# Patient Record
Sex: FEMALE | Race: BLACK OR AFRICAN AMERICAN | NOT HISPANIC OR LATINO | Employment: PART TIME | URBAN - METROPOLITAN AREA
[De-identification: names, ages, dates, MRNs, and addresses within clinical notes are randomized per-mention and may not be internally consistent; named-entity substitution may affect disease eponyms.]

---

## 2022-02-23 ENCOUNTER — INITIAL PRENATAL (OUTPATIENT)
Dept: OBSTETRICS AND GYNECOLOGY | Facility: CLINIC | Age: 22
End: 2022-02-23

## 2022-02-23 VITALS
BODY MASS INDEX: 23.74 KG/M2 | HEIGHT: 62 IN | DIASTOLIC BLOOD PRESSURE: 70 MMHG | SYSTOLIC BLOOD PRESSURE: 109 MMHG | WEIGHT: 129 LBS

## 2022-02-23 DIAGNOSIS — Z13.71 GENETIC DISEASE CARRIER STATUS TESTING, FEMALE: ICD-10-CM

## 2022-02-23 DIAGNOSIS — Z3A.01 LESS THAN 8 WEEKS GESTATION OF PREGNANCY: ICD-10-CM

## 2022-02-23 DIAGNOSIS — Z78.9 VARICELLA VACCINATION STATUS UNKNOWN: ICD-10-CM

## 2022-02-23 DIAGNOSIS — O09.291 HISTORY OF POSTPARTUM HEMORRHAGE, CURRENTLY PREGNANT IN FIRST TRIMESTER: ICD-10-CM

## 2022-02-23 DIAGNOSIS — Z11.3 SCREEN FOR STD (SEXUALLY TRANSMITTED DISEASE): ICD-10-CM

## 2022-02-23 DIAGNOSIS — Z34.91 UNCERTAIN DATES, ANTEPARTUM, FIRST TRIMESTER: ICD-10-CM

## 2022-02-23 DIAGNOSIS — O21.9 NAUSEA AND VOMITING IN PREGNANCY: ICD-10-CM

## 2022-02-23 DIAGNOSIS — O99.331 MATERNAL TOBACCO USE IN FIRST TRIMESTER: ICD-10-CM

## 2022-02-23 DIAGNOSIS — Z34.81 ENCOUNTER FOR SUPERVISION OF OTHER NORMAL PREGNANCY IN FIRST TRIMESTER: Primary | ICD-10-CM

## 2022-02-23 LAB
B-HCG UR QL: POSITIVE
EXPIRATION DATE: ABNORMAL
GLUCOSE UR STRIP-MCNC: NEGATIVE MG/DL
INTERNAL NEGATIVE CONTROL: POSITIVE
INTERNAL POSITIVE CONTROL: POSITIVE
Lab: ABNORMAL
PROT UR STRIP-MCNC: ABNORMAL MG/DL

## 2022-02-23 PROCEDURE — 81025 URINE PREGNANCY TEST: CPT | Performed by: OBSTETRICS & GYNECOLOGY

## 2022-02-23 PROCEDURE — 99204 OFFICE O/P NEW MOD 45 MIN: CPT | Performed by: OBSTETRICS & GYNECOLOGY

## 2022-02-23 RX ORDER — PNV NO.95/FERROUS FUM/FOLIC AC 28MG-0.8MG
1 TABLET ORAL DAILY
Qty: 90 TABLET | Refills: 3 | Status: SHIPPED | OUTPATIENT
Start: 2022-02-23 | End: 2022-09-19

## 2022-02-23 RX ORDER — PNV NO.95/FERROUS FUM/FOLIC AC 28MG-0.8MG
1 TABLET ORAL DAILY
COMMUNITY
Start: 2022-02-15 | End: 2022-02-23 | Stop reason: SDUPTHER

## 2022-02-23 RX ORDER — PROMETHAZINE HYDROCHLORIDE 25 MG/1
25 TABLET ORAL EVERY 6 HOURS PRN
Qty: 30 TABLET | Refills: 2 | Status: SHIPPED | OUTPATIENT
Start: 2022-02-23 | End: 2022-09-19

## 2022-02-23 NOTE — PROGRESS NOTES
Initial ob visit     CC- Here for care of pregnancy     Sarah Clifford is being seen today for her first obstetrical visit.  She is a 21 y.o.    6w0d gestation.     # 1 - Date: 19, Sex: Male, Weight: 2835 g (6 lb 4 oz), GA: 40w1d, Delivery: Vaginal, Spontaneous, Apgar1: None, Apgar5: None, Living: Living, Birth Comments: None    # 2 - Date: None, Sex: None, Weight: None, GA: None, Delivery: None, Apgar1: None, Apgar5: None, Living: None, Birth Comments: None      Current obstetric complaints : spotting x 1 day on 2022  Duration/severity of complaints:   Initial positive test date : 1.5 weeks ago     Location :  @ PCP's office    Prior obstetric issues, potential pregnancy concerns: none  Family history of genetic issues (includes FOB): none  Prior infections concerning in pregnancy (Rash, fever in last 2 weeks): none  Varicella Hx -negative history  Prior testing for Cystic Fibrosis Carrier or Sickle Cell Trait- unknown status  Prepregnancy BMI - Body mass index is 23.59 kg/m².  Hx of HSV for patient or partner : No     History reviewed. No pertinent past medical history.    History reviewed. No pertinent surgical history.      Current Outpatient Medications:   •  Prenatal Vit-Fe Fumarate-FA (prenatal vitamin 28-0.8) 28-0.8 MG tablet tablet, Take 1 tablet by mouth Daily., Disp: 90 tablet, Rfl: 3  •  promethazine (PHENERGAN) 25 MG tablet, Take 1 tablet by mouth Every 6 (Six) Hours As Needed for Nausea or Vomiting., Disp: 30 tablet, Rfl: 2    No Known Allergies    Social History     Socioeconomic History   • Marital status: Single   Tobacco Use   • Smoking status: Current Every Day Smoker     Types: Cigarettes   • Smokeless tobacco: Never Used   Substance and Sexual Activity   • Alcohol use: Not Currently   • Drug use: Not Currently   • Sexual activity: Yes     Partners: Male       Family History   Problem Relation Age of Onset   • Breast cancer Cousin    • Ovarian cancer Neg Hx    • Uterine cancer  "Neg Hx    • Colon cancer Neg Hx    • Deep vein thrombosis Neg Hx    • Pulmonary embolism Neg Hx        Review of systems     Constitutional : Nausea, fatigue nausea present, fatigue present    : Vaginal bleeding, cramping no bleeding, mild cramping   Breast Tenderness : yes   All other systems reviewed and negative        Objective    /70   Ht 157.5 cm (62\")   Wt 58.5 kg (129 lb)   LMP 01/12/2022 (Approximate)   BMI 23.59 kg/m²       General Appearance:    Alert, cooperative, in no acute distress, habitus normal    Head:    Normocephalic, without obvious abnormality, atraumatic   Eyes:            Lids and lashes normal, conjunctivae and sclerae normal, no   icterus, no pallor, corneas clear   Ears:    Ears appear intact with no abnormalities noted       Neck:   No adenopathy, supple, trachea midline, no thyromegaly   Back:     No kyphosis present, no scoliosis present,                       Lungs:     Clear to auscultation,respirations regular, even and     unlabored    Heart:    Regular rhythm and normal rate, normal S1 and S2, no            murmur, no gallop, no rub, no click   Breast Exam:    No masses, No nipple discharge   Abdomen:     Normal bowel sounds, no masses, no organomegaly, soft        non-tender, non-distended, no guarding, no rebound                 tenderness   Genitalia:    Vulva - BUS-WNL, NEFG    Vagina - No discharge, No bleeding    Cervix - No Lesions, closed     Uterus - Consistent with 7-8 weeks, anteverted     Adnexa - No mass, NT    Pelvimetry - clinically adequate, gynecoid pelvis     Extremities:   Moves all extremities well, no edema, no cyanosis, no              redness   Pulses:   Pulses palpable and equal bilaterally   Skin:   No bleeding, bruising or rash   Lymph nodes:   No palpable adenopathy   Neurologic:   Sensation intact, A&O times 3      Assessment & Plan     Diagnoses and all orders for this visit:    1. Encounter for supervision of other normal pregnancy in " first trimester (Primary)  -     OB Panel With HIV  -     Urine Culture - , Urine, Clean Catch    2. Less than 8 weeks gestation of pregnancy  -     POC Pregnancy, Urine    3. Nausea and vomiting in pregnancy    4. History of postpartum hemorrhage, currently pregnant in first trimester    5. Maternal tobacco use in first trimester    6. Screen for STD (sexually transmitted disease)  -     Chlamydia trachomatis, Neisseria gonorrhoeae, Trichomonas vaginalis, PCR - Swab, Vagina    7. Varicella vaccination status unknown  -     Varicella Zoster Antibody, IgG    8. Genetic disease carrier status testing, female  -     Inheritest Society Guided - Blood,    9. Uncertain dates, antepartum, first trimester  -     HCG, B-subunit, Quantitative  -     US Ob Transvaginal    Other orders  -     promethazine (PHENERGAN) 25 MG tablet; Take 1 tablet by mouth Every 6 (Six) Hours As Needed for Nausea or Vomiting.  Dispense: 30 tablet; Refill: 2  -     Prenatal Vit-Fe Fumarate-FA (prenatal vitamin 28-0.8) 28-0.8 MG tablet tablet; Take 1 tablet by mouth Daily.  Dispense: 90 tablet; Refill: 3        1) Pregnancy at 6w0d  2) nausea in pregnancy   Trial of phenergan   3) Hx of postpartum hemorrhage   Risk factor, notify delivery personal   4) Tobacco use   Working on quitting with nicotrol nasal spray          Activity recommendation : 150 minutes/week of moderate intensity aerobic activity unless we limit for bleeding, hypertension or other pregnancy complication   Patient is on Prenatal vitamins  Problem list reviewed and updated.  Reviewed routine prenatal care with the office to include but not limited to   Zika (travel restrictions/ok to use insect repellant), not to changing cat litter, food restrictions, avoidance of alcohol, tobacco and drugs and saunas/hot tubs.   All questions answered.     Rober Grayson MD   2/23/2022  10:49 EST

## 2022-02-24 PROBLEM — Z28.39 MATERNAL VARICELLA, NON-IMMUNE: Status: ACTIVE | Noted: 2022-02-24

## 2022-02-24 PROBLEM — O09.899 MATERNAL VARICELLA, NON-IMMUNE: Status: ACTIVE | Noted: 2022-02-24

## 2022-02-24 LAB
ABO GROUP BLD: ABNORMAL
BASOPHILS # BLD AUTO: 0 X10E3/UL (ref 0–0.2)
BASOPHILS NFR BLD AUTO: 1 %
BLD GP AB SCN SERPL QL: NEGATIVE
EOSINOPHIL # BLD AUTO: 0.1 X10E3/UL (ref 0–0.4)
EOSINOPHIL NFR BLD AUTO: 2 %
ERYTHROCYTE [DISTWIDTH] IN BLOOD BY AUTOMATED COUNT: 17 % (ref 11.7–15.4)
HBV SURFACE AG SERPL QL IA: NEGATIVE
HCG INTACT+B SERPL-ACNC: 2610 MIU/ML
HCT VFR BLD AUTO: 35.4 % (ref 34–46.6)
HCV AB S/CO SERPL IA: <0.1 S/CO RATIO (ref 0–0.9)
HGB BLD-MCNC: 11.2 G/DL (ref 11.1–15.9)
HIV 1+2 AB+HIV1 P24 AG SERPL QL IA: NON REACTIVE
IMM GRANULOCYTES # BLD AUTO: 0 X10E3/UL (ref 0–0.1)
IMM GRANULOCYTES NFR BLD AUTO: 0 %
LYMPHOCYTES # BLD AUTO: 2 X10E3/UL (ref 0.7–3.1)
LYMPHOCYTES NFR BLD AUTO: 48 %
MCH RBC QN AUTO: 21.9 PG (ref 26.6–33)
MCHC RBC AUTO-ENTMCNC: 31.6 G/DL (ref 31.5–35.7)
MCV RBC AUTO: 69 FL (ref 79–97)
MONOCYTES # BLD AUTO: 0.5 X10E3/UL (ref 0.1–0.9)
MONOCYTES NFR BLD AUTO: 11 %
NEUTROPHILS # BLD AUTO: 1.5 X10E3/UL (ref 1.4–7)
NEUTROPHILS NFR BLD AUTO: 38 %
PLATELET # BLD AUTO: 398 X10E3/UL (ref 150–450)
RBC # BLD AUTO: 5.12 X10E6/UL (ref 3.77–5.28)
RH BLD: POSITIVE
RPR SER QL: NON REACTIVE
RUBV IGG SERPL IA-ACNC: 1.67 INDEX
VZV IGG SER IA-ACNC: <135 INDEX
WBC # BLD AUTO: 4 X10E3/UL (ref 3.4–10.8)

## 2022-02-25 ENCOUNTER — TELEPHONE (OUTPATIENT)
Dept: OBSTETRICS AND GYNECOLOGY | Facility: CLINIC | Age: 22
End: 2022-02-25

## 2022-02-25 LAB
BACTERIA UR CULT: NO GROWTH
BACTERIA UR CULT: NORMAL
C TRACH RRNA SPEC QL NAA+PROBE: NEGATIVE
N GONORRHOEA RRNA SPEC QL NAA+PROBE: NEGATIVE
T VAGINALIS DNA SPEC QL NAA+PROBE: NEGATIVE

## 2022-02-25 NOTE — TELEPHONE ENCOUNTER
----- Message from Estefany Irizarry MA sent at 2/24/2022  1:37 PM EST -----  L/m for pt/araceli  ----- Message -----  From: Rober Grayson MD  Sent: 2/24/2022  11:09 AM EST  To: ANA Mead, her HCG is 2,610 yesterday. This is consistent with 5-6 weeks. So keep with plan to do ultrasound coming up. Please let her know. Thanks, Dr. Grayson

## 2022-03-01 ENCOUNTER — TELEPHONE (OUTPATIENT)
Dept: OBSTETRICS AND GYNECOLOGY | Facility: CLINIC | Age: 22
End: 2022-03-01

## 2022-03-01 NOTE — TELEPHONE ENCOUNTER
She went to [facility] I am not sure how to go about getting a report from them. I tried getting more info from her about what they saw to give her the dates. She says they weren't able to see much but they were trying to get measurements that were not matching up (not sure what they were measuring because she said there wasn't a sac).   I let her know that at 5 weeks typically not much is seen on ultrasound and she has an appointment for ultrasound and to see you on 3/7. She seemed okay with this and will come to her appointment.     Thanks,   Aleja

## 2022-03-01 NOTE — TELEPHONE ENCOUNTER
Aleja,     Yes. We do not typically get a report from those type of places (they are not clinical and do not generate a report). They are typically designed to encourage pregnancy and have them avoid termination by seeing the baby on ultrasound. Of note many of the people doing ultrasounds are volunteers and I doubt they have much in the way of credentials (so it is not a medical ultrasound)     So we will just use our dating. I do not doubt she is earlier than we expect.  But will just review with more information from our office.     Thanks,   Dr. Grayson

## 2022-03-01 NOTE — TELEPHONE ENCOUNTER
Aleja,     We need significantly more information   Where did she go for an ultrasound, what did they see to give her that date?- what date was this she had the ultrasound.   Who told her they saw nothing?    You are correct. In early pregnancy we use the first day of the last normal menstrual period, but then adjust based on ultrasound if things fall out of a specific range depending on how far along she is. But typically at 5 weeks, not much is seen on ultrasound, this is why we traditionally try and delay the ultrasound until 7-8 weeks, when we can get good date of the baby, and see certain things like a heartbeat.  If she has concerns, I would suggest we try and get a copy of the report and that she make an appointment to see us in the office.     Thanks,   Dr. Grayson

## 2022-03-01 NOTE — TELEPHONE ENCOUNTER
Joshua Grayson,   Pt called she went somewhere else and got ultrasound done they measured her at 5 weeks 2 days. Which is different than she was told her. Tried letting her know that date can change with ultrasound. She said the ultrasound tech could not see anything on the ultrasound and pt was concerned about it. Please advise.     Thanks,   Aleja

## 2022-03-07 ENCOUNTER — ROUTINE PRENATAL (OUTPATIENT)
Dept: OBSTETRICS AND GYNECOLOGY | Facility: CLINIC | Age: 22
End: 2022-03-07

## 2022-03-07 VITALS — DIASTOLIC BLOOD PRESSURE: 70 MMHG | BODY MASS INDEX: 23.78 KG/M2 | SYSTOLIC BLOOD PRESSURE: 114 MMHG | WEIGHT: 130 LBS

## 2022-03-07 DIAGNOSIS — O03.9 SPONTANEOUS MISCARRIAGE: Primary | ICD-10-CM

## 2022-03-07 LAB
GLUCOSE UR STRIP-MCNC: NEGATIVE MG/DL
PROT UR STRIP-MCNC: ABNORMAL MG/DL

## 2022-03-07 PROCEDURE — 99213 OFFICE O/P EST LOW 20 MIN: CPT | Performed by: OBSTETRICS & GYNECOLOGY

## 2022-03-07 RX ORDER — CEPHALEXIN 500 MG/1
CAPSULE ORAL
COMMUNITY
Start: 2022-03-05 | End: 2022-09-19

## 2022-03-07 NOTE — PROGRESS NOTES
OB follow up     Sarah Clifford is a 21 y.o.  7w5d being seen today for her obstetrical visit.  Patient reports no complaints. Fetal movement: absent.. US c/w MAB.     Started bleeding on Saturday   [facility] for evaluation   HCG, quant - 6,736   Ultrasound on Saturday with 1.1 cm sac, consistent with early GS (no yolk sac or fetal pole seen)   Getting heavier with active clots.   Hg on Saturday was 12 grams         Review of Systems  Cramping/contractions : present  Vaginal bleeding: present   Fetal movement none     /70   Wt 59 kg (130 lb)   LMP 2022 (Approximate)   BMI 23.78 kg/m²     Speculum/Cervix Minimal blood in vault, POC at cervix grasped and removed.    Uterine Size: 7-8 cm       Assessment    Diagnoses and all orders for this visit:    1. Spontaneous miscarriage (Primary)  -     Reference Histopathology  -     HCG, B-subunit, Quantitative; Future        1) Spontaneous miscarriage  Rh+  Appears close to complete  Ultrasound with 1.6 cm lining today, but no evidence of POC or GS   Etiology, treatment options and follow up reviewed.   Going to watch bleeding pattern   Check path of POC at cervix  Do HCG, quant in 7-10 days   Call if bleeding heavy, persistent to consider misoprostol or D&C  Discussed trying again vs contraception   Considering options   Can let us know down the road once time to process.       Plan    Expectations and warning signs reviewed  Next steps discussed in detail     Rober Grayson MD   3/7/2022  16:03 EST

## 2022-03-09 LAB
DX ICD CODE: NORMAL
DX ICD CODE: NORMAL
PATH REPORT.FINAL DX SPEC: NORMAL
PATH REPORT.GROSS SPEC: NORMAL
PATH REPORT.SITE OF ORIGIN SPEC: NORMAL
PATHOLOGIST NAME: NORMAL
PAYMENT PROCEDURE: NORMAL

## 2022-03-10 NOTE — PROGRESS NOTES
Estefany, path confirms miscarriage - can you see how her bleeding is progressing and let her know. Thanks, Dr. Grayson

## 2022-03-11 ENCOUNTER — TELEPHONE (OUTPATIENT)
Dept: OBSTETRICS AND GYNECOLOGY | Facility: CLINIC | Age: 22
End: 2022-03-11

## 2022-03-11 RX ORDER — MISOPROSTOL 200 UG/1
400 TABLET ORAL EVERY 4 HOURS
Qty: 4 TABLET | Refills: 0 | Status: SHIPPED | OUTPATIENT
Start: 2022-03-11 | End: 2022-03-12

## 2022-03-11 NOTE — TELEPHONE ENCOUNTER
Poke to pt, sounds like she has passed the pregnancy. Bleeding is now spotting like the end of her period. She would like to monitor the bleeding over the weekend, have her blood drawn and follow from there.    Estefany

## 2022-03-11 NOTE — TELEPHONE ENCOUNTER
----- Message from Estefany Irizarry MA sent at 3/10/2022  2:20 PM EST -----  L/m for pt/araceli  ----- Message -----  From: Rober Grayson MD  Sent: 3/10/2022  12:33 PM EST  To: ANA Mead, path confirms miscarriage - can you see how her bleeding is progressing and let her know. Thanks, Dr. Grayson

## 2022-03-11 NOTE — TELEPHONE ENCOUNTER
Estefany,     I think that she likely did pass it all. She should not be spotting, just more like the end of her cycle. If she wants to do the medication to ensure all is expelled. It was sent to pharmacy. Should take motrin or aleve before taking this medication.     Thanks,   Dr. Grayson

## 2022-03-11 NOTE — TELEPHONE ENCOUNTER
Pt returning your call. She said she thought maybe she knew what it was about and it was concerning her miscarriage. She said she believed she has passed it all after she had big clots come out. She said that she was told her blood should fade into a pink and it did for a little while but now when she wipes, there is still bright red blood coming out. She said that her and Dr. Grayson discussed medication if this continued. Please advise.    Thanks,  Narciso

## 2022-03-11 NOTE — TELEPHONE ENCOUNTER
----- Message from Estefany Irizarry MA sent at 3/11/2022  9:40 AM EST -----  Dr. Grayson-    Pt's response...      Pt returning your call. She said she thought maybe she knew what it was about and it was concerning her miscarriage. She said she believed she has passed it all after she had big clots come out. She said that she was told her blood should fade into a pink and it did for a little while but now when she wipes, there is still bright red blood coming out. She said that her and Dr. Grayson discussed medication if this continued. Please advise.    Thanks,  Estefany  ----- Message -----  From: Estefany Irizarry MA  Sent: 3/10/2022   2:22 PM EST  To: Estefany Irizarry MA    L/m for pt/araceli  ----- Message -----  From: Rober Grayson MD  Sent: 3/10/2022  12:33 PM EST  To: ANA Mead path confirms miscarriage - can you see how her bleeding is progressing and let her know. Thanks, Dr. Grayson

## 2022-03-16 ENCOUNTER — TELEPHONE (OUTPATIENT)
Dept: OBSTETRICS AND GYNECOLOGY | Facility: CLINIC | Age: 22
End: 2022-03-16

## 2022-03-16 LAB
CLINICAL INFO: ABNORMAL
ETHNIC BACKGROUND STATED: ABNORMAL
GENE DIS ANL CARRIER INTERP-IMP: ABNORMAL
GENE MUT TESTED BLD/T: ABNORMAL
GENERAL COMMENTS:: ABNORMAL
LAB DIRECTOR NAME PROVIDER: ABNORMAL
LABORATORY COMMENT REPORT: ABNORMAL
Lab: ABNORMAL
REASON FOR REFERRAL (NARRATIVE): ABNORMAL
REF LAB TEST METHOD: ABNORMAL
SPECIMEN SOURCE: ABNORMAL

## 2022-03-16 NOTE — TELEPHONE ENCOUNTER
----- Message from Rober Grayson MD sent at 3/16/2022 12:48 PM EDT -----  Estefany, we discussed this. She is aware she has sickle cell trait (her father has sickle cell disease- so she knows how it works)> Miscarried this pregnancy, we can test FOB in future pregnancies. I did encourage her to do final HCG, quant - her bleeding has stopped. Thanks, Dr. Grayson

## 2022-09-19 ENCOUNTER — INITIAL PRENATAL (OUTPATIENT)
Dept: OBSTETRICS AND GYNECOLOGY | Facility: CLINIC | Age: 22
End: 2022-09-19

## 2022-09-19 VITALS — DIASTOLIC BLOOD PRESSURE: 61 MMHG | WEIGHT: 123 LBS | BODY MASS INDEX: 22.5 KG/M2 | SYSTOLIC BLOOD PRESSURE: 92 MMHG

## 2022-09-19 DIAGNOSIS — O21.9 NAUSEA AND VOMITING IN PREGNANCY: ICD-10-CM

## 2022-09-19 DIAGNOSIS — O09.899 MATERNAL VARICELLA, NON-IMMUNE: ICD-10-CM

## 2022-09-19 DIAGNOSIS — O09.291 HISTORY OF POSTPARTUM HEMORRHAGE, CURRENTLY PREGNANT IN FIRST TRIMESTER: ICD-10-CM

## 2022-09-19 DIAGNOSIS — D57.3 SICKLE CELL TRAIT IN MOTHER AFFECTING PREGNANCY: ICD-10-CM

## 2022-09-19 DIAGNOSIS — Z34.81 ENCOUNTER FOR SUPERVISION OF OTHER NORMAL PREGNANCY IN FIRST TRIMESTER: Primary | ICD-10-CM

## 2022-09-19 DIAGNOSIS — O99.019 SICKLE CELL TRAIT IN MOTHER AFFECTING PREGNANCY: ICD-10-CM

## 2022-09-19 DIAGNOSIS — O09.291 PRIOR MISCARRIAGE WITH PREGNANCY IN FIRST TRIMESTER, ANTEPARTUM: ICD-10-CM

## 2022-09-19 DIAGNOSIS — Z11.3 SCREEN FOR STD (SEXUALLY TRANSMITTED DISEASE): ICD-10-CM

## 2022-09-19 DIAGNOSIS — Z28.39 MATERNAL VARICELLA, NON-IMMUNE: ICD-10-CM

## 2022-09-19 LAB
B-HCG UR QL: POSITIVE
EXPIRATION DATE: ABNORMAL
GLUCOSE UR STRIP-MCNC: NEGATIVE MG/DL
INTERNAL NEGATIVE CONTROL: NEGATIVE
INTERNAL POSITIVE CONTROL: POSITIVE
Lab: ABNORMAL
PROT UR STRIP-MCNC: ABNORMAL MG/DL

## 2022-09-19 PROCEDURE — 81025 URINE PREGNANCY TEST: CPT | Performed by: OBSTETRICS & GYNECOLOGY

## 2022-09-19 PROCEDURE — 99214 OFFICE O/P EST MOD 30 MIN: CPT | Performed by: OBSTETRICS & GYNECOLOGY

## 2022-09-19 RX ORDER — SWAB
1 SWAB, NON-MEDICATED MISCELLANEOUS DAILY
Qty: 90 EACH | Refills: 3 | Status: SHIPPED | OUTPATIENT
Start: 2022-09-19

## 2022-09-19 RX ORDER — PROMETHAZINE HYDROCHLORIDE 25 MG/1
25 TABLET ORAL EVERY 6 HOURS PRN
Qty: 30 TABLET | Refills: 2 | Status: SHIPPED | OUTPATIENT
Start: 2022-09-19 | End: 2023-03-20

## 2022-09-19 RX ORDER — MEGESTROL ACETATE 40 MG
1 TABLET ORAL DAILY
COMMUNITY
Start: 2022-09-14 | End: 2023-03-20

## 2022-09-19 NOTE — PROGRESS NOTES
Initial ob visit     CC- Here for care of pregnancy     Sarah Clifford is being seen today for her first obstetrical visit.  She is a 21 y.o.    5w4d gestation.     # 1 - Date: 19, Sex: Male, Weight: 2835 g (6 lb 4 oz), GA: 40w1d, Delivery: Vaginal, Spontaneous, Apgar1: None, Apgar5: None, Living: Living, Birth Comments: None    # 2 - Date: 24, Sex: None, Weight: None, GA: 7w0d, Delivery: Spontaneous , Apgar1: None, Apgar5: None, Living: None, Birth Comments: None    # 3 - Date: None, Sex: None, Weight: None, GA: None, Delivery: None, Apgar1: None, Apgar5: None, Living: None, Birth Comments: None      Current obstetric complaints : fatigue   Duration/severity of complaints:   Initial positive test date : 2022     Location : @ home    Recent early miscarriage.     Prior obstetric issues, potential pregnancy concerns: none  Family history of genetic issues (includes FOB): none  Prior infections concerning in pregnancy (Rash, fever in last 2 weeks): none  Varicella Hx  Negative status  Prior testing for Cystic Fibrosis Carrier or Sickle Cell Trait- unknown status  Prepregnancy BMI - Body mass index is 22.5 kg/m².  Hx of HSV for patient or partner : No    History reviewed. No pertinent past medical history.    History reviewed. No pertinent surgical history.      Current Outpatient Medications:   •  Pediatric Multivitamins-Iron (Cerovite Jr) 18 MG chewable tablet chewable tablet, Chew 1 tablet Daily. Chew and swallow., Disp: , Rfl:   •  Prenatal 28-0.8 MG tablet, Take 1 tablet by mouth Daily. Please use formulary or generic, with DHA ideal, Disp: 90 each, Rfl: 3  •  promethazine (PHENERGAN) 25 MG tablet, Take 1 tablet by mouth Every 6 (Six) Hours As Needed for Nausea or Vomiting., Disp: 30 tablet, Rfl: 2    No Known Allergies    Social History     Socioeconomic History   • Marital status: Single   Tobacco Use   • Smoking status: Current Every Day Smoker     Types: Cigarettes   •  Smokeless tobacco: Never Used   Substance and Sexual Activity   • Alcohol use: Not Currently   • Drug use: Not Currently   • Sexual activity: Yes     Partners: Male       Family History   Problem Relation Age of Onset   • Breast cancer Cousin    • Ovarian cancer Neg Hx    • Uterine cancer Neg Hx    • Colon cancer Neg Hx    • Deep vein thrombosis Neg Hx    • Pulmonary embolism Neg Hx        Review of systems     Constitutional : Nausea, fatigue nausea present, fatigue present    : Vaginal bleeding, cramping no bleeding, mild cramping   Breast Tenderness : yes  All other systems reviewed and negative        Objective    BP 92/61   Wt 55.8 kg (123 lb)   LMP 08/11/2022 (Approximate)   Breastfeeding Unknown   BMI 22.50 kg/m²       General Appearance:    Alert, cooperative, in no acute distress, habitus normal.    Head:    Normocephalic, without obvious abnormality, atraumatic   Eyes:            Lids and lashes normal, conjunctivae and sclerae normal, no   icterus, no pallor, corneas clear   Ears:    Ears appear intact with no abnormalities noted       Neck:   No adenopathy, supple, trachea midline, no thyromegaly   Back:     No kyphosis present, no scoliosis present,                       Lungs:     Clear to auscultation,respirations regular, even and     unlabored    Heart:    Regular rhythm and normal rate, normal S1 and S2, no            murmur, no gallop, no rub, no click   Breast Exam:    No masses, No nipple discharge   Abdomen:     Normal bowel sounds, no masses, no organomegaly, soft        non-tender, non-distended, no guarding, no rebound                 tenderness   Genitalia:    Vulva - BUS-WNL, NEFG    Vagina - No discharge, No bleeding    Cervix - No Lesions, closed     Uterus - Consistent with 6-7 weeks, anteverted     Adnexa - No mass, NT    Pelvimetry - clinically adequate, gynecoid pelvis     Extremities:   Moves all extremities well, no edema, no cyanosis, no              redness   Pulses:    Pulses palpable and equal bilaterally   Skin:   No bleeding, bruising or rash   Lymph nodes:   No palpable adenopathy   Neurologic:   Sensation intact, A&O times 3        Brief Urine Lab Results  (Last result in the past 365 days)      Color   Clarity   Blood   Leuk Est   Nitrite   Protein   CREAT   Urine HCG        09/19/22 1319               Positive              Assessment & Plan     Diagnoses and all orders for this visit:    1. Encounter for supervision of other normal pregnancy in first trimester (Primary)  -     POC Pregnancy, Urine  -     OB Panel With HIV  -     Urine Culture - , Urine, Clean Catch    2. Prior miscarriage with pregnancy in first trimester, antepartum  -     US Ob Transvaginal; Future  -     HCG, B-subunit, Quantitative    3. History of postpartum hemorrhage, currently pregnant in first trimester    4. Sickle cell trait in mother affecting pregnancy (HCC)    5. Screen for STD (sexually transmitted disease)  -     Chlamydia trachomatis, Neisseria gonorrhoeae, Trichomonas vaginalis, PCR - Swab, Vagina    6. Nausea and vomiting in pregnancy    7. Maternal varicella, non-immune    Other orders  -     Prenatal 28-0.8 MG tablet; Take 1 tablet by mouth Daily. Please use formulary or generic, with DHA ideal  Dispense: 90 each; Refill: 3  -     promethazine (PHENERGAN) 25 MG tablet; Take 1 tablet by mouth Every 6 (Six) Hours As Needed for Nausea or Vomiting.  Dispense: 30 tablet; Refill: 2        1) Pregnancy at 5w4d  Had miscarriage earlier this year.   Check HCG, quant and dating in 2 weeks   Expectations reviewed  That work up showed   Varicella non-immune, Sickle trait.   2) Nausea in pregnancy   Trial of phenergan   3) Hx of PPH           Activity recommendation : 150 minutes/week of moderate intensity aerobic activity unless we limit for bleeding, hypertension or other pregnancy complication   Patient is on Prenatal vitamins  Problem list reviewed and updated.  Reviewed routine prenatal care  with the office to include but not limited to   General pregnancy recommendations reviewed including but not limited to not changing cat litter, food restrictions, avoidance of alcohol, tobacco and drugs and saunas/hot tubs.   All questions answered.     Rober Grayson MD   9/19/2022  13:41 EDT

## 2022-09-20 ENCOUNTER — TELEPHONE (OUTPATIENT)
Dept: OBSTETRICS AND GYNECOLOGY | Facility: CLINIC | Age: 22
End: 2022-09-20

## 2022-09-20 LAB
ABO GROUP BLD: ABNORMAL
BASOPHILS # BLD AUTO: 0 X10E3/UL (ref 0–0.2)
BASOPHILS NFR BLD AUTO: 1 %
BLD GP AB SCN SERPL QL: NEGATIVE
EOSINOPHIL # BLD AUTO: 0.1 X10E3/UL (ref 0–0.4)
EOSINOPHIL NFR BLD AUTO: 1 %
ERYTHROCYTE [DISTWIDTH] IN BLOOD BY AUTOMATED COUNT: 17.1 % (ref 11.7–15.4)
HBV SURFACE AG SERPL QL IA: NEGATIVE
HCG INTACT+B SERPL-ACNC: 297 MIU/ML
HCT VFR BLD AUTO: 37.6 % (ref 34–46.6)
HCV AB S/CO SERPL IA: <0.1 S/CO RATIO (ref 0–0.9)
HGB BLD-MCNC: 11.9 G/DL (ref 11.1–15.9)
HIV 1+2 AB+HIV1 P24 AG SERPL QL IA: NON REACTIVE
IMM GRANULOCYTES # BLD AUTO: 0 X10E3/UL (ref 0–0.1)
IMM GRANULOCYTES NFR BLD AUTO: 0 %
LYMPHOCYTES # BLD AUTO: 2.2 X10E3/UL (ref 0.7–3.1)
LYMPHOCYTES NFR BLD AUTO: 46 %
MCH RBC QN AUTO: 22.4 PG (ref 26.6–33)
MCHC RBC AUTO-ENTMCNC: 31.6 G/DL (ref 31.5–35.7)
MCV RBC AUTO: 71 FL (ref 79–97)
MONOCYTES # BLD AUTO: 0.5 X10E3/UL (ref 0.1–0.9)
MONOCYTES NFR BLD AUTO: 10 %
NEUTROPHILS # BLD AUTO: 2 X10E3/UL (ref 1.4–7)
NEUTROPHILS NFR BLD AUTO: 42 %
PLATELET # BLD AUTO: 360 X10E3/UL (ref 150–450)
RBC # BLD AUTO: 5.31 X10E6/UL (ref 3.77–5.28)
RH BLD: POSITIVE
RPR SER QL: NON REACTIVE
RUBV IGG SERPL IA-ACNC: 1.84 INDEX
WBC # BLD AUTO: 4.8 X10E3/UL (ref 3.4–10.8)

## 2022-09-20 NOTE — PROGRESS NOTES
Estefany, I left her a message. HCG level is 297, so we need to repeat Wednesday to ensure going up appropriately (might be earlier than expected). She was going to call back - Please let her know timing of next draw (48 hours) and will call with results on Thursday. Thanks, Dr. Grayson

## 2022-09-21 ENCOUNTER — TELEPHONE (OUTPATIENT)
Dept: OBSTETRICS AND GYNECOLOGY | Facility: CLINIC | Age: 22
End: 2022-09-21

## 2022-09-21 LAB
BACTERIA UR CULT: NORMAL
BACTERIA UR CULT: NORMAL
C TRACH RRNA SPEC QL NAA+PROBE: NEGATIVE
N GONORRHOEA RRNA SPEC QL NAA+PROBE: NEGATIVE
T VAGINALIS RRNA SPEC QL NAA+PROBE: NEGATIVE

## 2022-09-21 NOTE — PROGRESS NOTES
Estefany, she is aware. Should drop by today to do HCG, quant to see if increasing as expected. Please make  aware. Thanks, Dr. Grayson

## 2022-09-21 NOTE — TELEPHONE ENCOUNTER
----- Message from Rober Grayson MD sent at 9/21/2022  8:52 AM EDT -----  Estefany, she is aware. Should drop by today to do HCG, quant to see if increasing as expected. Please make  aware. Thanks, Dr. Grayson

## 2022-09-23 ENCOUNTER — TELEPHONE (OUTPATIENT)
Dept: OBSTETRICS AND GYNECOLOGY | Facility: CLINIC | Age: 22
End: 2022-09-23

## 2022-09-23 NOTE — TELEPHONE ENCOUNTER
"Pt aware \"Estefany, message left on voice mail. Did just at double to 596.  So, I think plan to stick with Ultrasound as scheduled for 10/5, hopefully > 6-7 weeks and can see what we need to see. Should call if any issues develop (bleeding, increased pain). Can repeat in a week or two if concerned, but no official need to repeat if okay with her. Can you let them know. Thanks, Dr. Grayson\"  "

## 2022-09-23 NOTE — TELEPHONE ENCOUNTER
----- Message from Rober Grayson MD sent at 9/22/2022 12:19 PM EDT -----  Estefany, message left on voice mail. Did just at double to 596.  So, I think plan to stick with Ultrasound as scheduled for 10/5, hopefully > 6-7 weeks and can see what we need to see. Should call if any issues develop (bleeding, increased pain). Can repeat in a week or two if concerned, but no official need to repeat if okay with her. Can you let them know. Thanks, Dr. Grayson

## 2022-10-05 ENCOUNTER — ROUTINE PRENATAL (OUTPATIENT)
Dept: OBSTETRICS AND GYNECOLOGY | Facility: CLINIC | Age: 22
End: 2022-10-05

## 2022-10-05 VITALS — BODY MASS INDEX: 22.86 KG/M2 | DIASTOLIC BLOOD PRESSURE: 76 MMHG | WEIGHT: 125 LBS | SYSTOLIC BLOOD PRESSURE: 116 MMHG

## 2022-10-05 DIAGNOSIS — O99.019 SICKLE CELL TRAIT IN MOTHER AFFECTING PREGNANCY: ICD-10-CM

## 2022-10-05 DIAGNOSIS — N64.4 BREAST TENDERNESS IN FEMALE: ICD-10-CM

## 2022-10-05 DIAGNOSIS — O09.291 HISTORY OF POSTPARTUM HEMORRHAGE, CURRENTLY PREGNANT IN FIRST TRIMESTER: ICD-10-CM

## 2022-10-05 DIAGNOSIS — D57.3 SICKLE CELL TRAIT IN MOTHER AFFECTING PREGNANCY: ICD-10-CM

## 2022-10-05 DIAGNOSIS — Z34.81 ENCOUNTER FOR SUPERVISION OF OTHER NORMAL PREGNANCY IN FIRST TRIMESTER: Primary | ICD-10-CM

## 2022-10-05 LAB
GLUCOSE UR STRIP-MCNC: NEGATIVE MG/DL
PROT UR STRIP-MCNC: NEGATIVE MG/DL

## 2022-10-05 PROCEDURE — 99213 OFFICE O/P EST LOW 20 MIN: CPT | Performed by: OBSTETRICS & GYNECOLOGY

## 2022-10-05 NOTE — PROGRESS NOTES
OB follow up     Sarah Clifford is a 21 y.o.  6w6d being seen today for her obstetrical visit.  Patient reports breast lump . Fetal movement: too early .    Her prenatal care is complicated by (and status): hx of postpartum hemorrhage     Review of Systems  Cramping/contractions : none   Vaginal bleeding: none   Fetal movement too early     /76   Wt 56.7 kg (125 lb)   LMP 2022 (Approximate)   BMI 22.86 kg/m²     FHT: + on ultrasound    Uterine Size: Changed per ultrasound        Assessment    Diagnoses and all orders for this visit:    1. Encounter for supervision of other normal pregnancy in first trimester (Primary)  -     POC Urinalysis Dipstick    2. History of postpartum hemorrhage, currently pregnant in first trimester    3. Sickle cell trait in mother affecting pregnancy (HCC)    4. Breast tenderness in female        1) pregnancy at 6w6d   Labs, cultures and ultrasound reviewed.   Did adjust due date based on today's ultrasound (per ACOG criteria)   2) hx of PPH, no intervention today   3) Sickle trait, urine culture negative  4) Breast tenderness/sub areolar lump 1 cm   On exam is palpable, non-tender, mobile and rubbery   Discussed waiting for postpartum and if persistent consider breast ultrasound at that time  Findings/age, etc not consistent with cancer concern   Encouraged her to continue to monitor and report back if increases in size or other newer concerns come up.         Plan    Reviewed this stage of pregnancy  Problem list updated   Follow up in 4 weeks    Rober Grayson MD   10/5/2022  11:14 EDT

## 2022-10-13 ENCOUNTER — TELEPHONE (OUTPATIENT)
Dept: OBSTETRICS AND GYNECOLOGY | Facility: CLINIC | Age: 22
End: 2022-10-13

## 2022-10-13 RX ORDER — ONDANSETRON 4 MG/1
4 TABLET, FILM COATED ORAL DAILY PRN
Qty: 30 TABLET | Refills: 1 | Status: SHIPPED | OUTPATIENT
Start: 2022-10-13 | End: 2023-03-20

## 2022-10-13 NOTE — TELEPHONE ENCOUNTER
Rx sent for Zofran for nausea. If pain is worsening or more severe, call us. If she had vaginal bleeding she should call us as well. As it was only 3-4 minutes would continue to watch. Thanks!

## 2022-10-13 NOTE — TELEPHONE ENCOUNTER
8w ob pt called to report abdominal pain while laying down this morning.  It only lasted about 3-4 minutes, no bleeding.  Pt had mab earlier this year.  States the pain felt like that, but did not last as long.    Also stated her nausea is getting worse, asking for a different mediation.     Please advise.     Pt # [phone]

## 2022-11-02 ENCOUNTER — ROUTINE PRENATAL (OUTPATIENT)
Dept: OBSTETRICS AND GYNECOLOGY | Facility: CLINIC | Age: 22
End: 2022-11-02

## 2022-11-02 VITALS — WEIGHT: 124 LBS | BODY MASS INDEX: 22.68 KG/M2 | SYSTOLIC BLOOD PRESSURE: 96 MMHG | DIASTOLIC BLOOD PRESSURE: 59 MMHG

## 2022-11-02 DIAGNOSIS — Z34.81 ENCOUNTER FOR SUPERVISION OF OTHER NORMAL PREGNANCY IN FIRST TRIMESTER: Primary | ICD-10-CM

## 2022-11-02 DIAGNOSIS — D57.3 SICKLE CELL TRAIT IN MOTHER AFFECTING PREGNANCY: ICD-10-CM

## 2022-11-02 DIAGNOSIS — Z13.79 ENCOUNTER FOR GENETIC SCREENING FOR DOWN SYNDROME: ICD-10-CM

## 2022-11-02 DIAGNOSIS — O09.291 HISTORY OF POSTPARTUM HEMORRHAGE, CURRENTLY PREGNANT IN FIRST TRIMESTER: ICD-10-CM

## 2022-11-02 DIAGNOSIS — O99.019 SICKLE CELL TRAIT IN MOTHER AFFECTING PREGNANCY: ICD-10-CM

## 2022-11-02 LAB
GLUCOSE UR STRIP-MCNC: NEGATIVE MG/DL
PROT UR STRIP-MCNC: NEGATIVE MG/DL

## 2022-11-02 PROCEDURE — 99213 OFFICE O/P EST LOW 20 MIN: CPT | Performed by: OBSTETRICS & GYNECOLOGY

## 2022-11-02 NOTE — PROGRESS NOTES
OB follow up     Sarah Clifford is a 21 y.o.  10w6d being seen today for her obstetrical visit.  Patient reports nausea/vomiting.. Fetal movement: absent.    Her prenatal care is complicated by (and status): hx of PPH    Review of Systems  Cramping/contractions : None   Vaginal bleeding: none   Fetal movement too early     BP 96/59   Wt 56.2 kg (124 lb)   LMP 2022 (Approximate)   BMI 22.68 kg/m²     FHT: 166 BPM   Uterine Size: 11 cm       Assessment    Diagnoses and all orders for this visit:    1. Encounter for supervision of other normal pregnancy in first trimester (Primary)    2. History of postpartum hemorrhage, currently pregnant in first trimester    3. Sickle cell trait in mother affecting pregnancy (HCC)    4. Encounter for genetic screening for Down Syndrome  -     [id number] PLUS Core+SCA - Blood,        1) pregnancy at 10w6d   NIPT for aneuploidy screen   2) Hx of PPH, no intervention today   3) Sickle trait - urine culture this trimester         Plan    Reviewed this stage of pregnancy  Problem list updated   Follow up in 4 weeks    Rober Grayson MD   2022  12:04 EDT

## 2022-11-06 LAB
CFDNA.FET/CFDNA.TOTAL SFR FETUS: NORMAL %
CITATION REF LAB TEST: NORMAL
FET 13+18+21+X+Y ANEUP PLAS.CFDNA: NEGATIVE
FET CHR 21 TS PLAS.CFDNA QL: NEGATIVE
FET MS X RISK WBC.DNA+CFDNA QL: NOT DETECTED
FET SEX PLAS.CFDNA DOSAGE CFDNA: NORMAL
FET TS 13 RISK PLAS.CFDNA QL: NEGATIVE
FET TS 18 RISK WBC.DNA+CFDNA QL: NEGATIVE
FET X + Y ANEUP RISK PLAS.CFDNA SEQ-IMP: NOT DETECTED
GA EST FROM CONCEPTION DATE: NORMAL D
GESTATIONAL AGE > 9:: YES
LAB DIRECTOR NAME PROVIDER: NORMAL
LAB DIRECTOR NAME PROVIDER: NORMAL
LABORATORY COMMENT REPORT: NORMAL
LIMITATIONS OF THE TEST: NORMAL
NEGATIVE PREDICTIVE VALUE: NORMAL
NOTE: NORMAL
PERFORMANCE CHARACTERISTICS: NORMAL
POSITIVE PREDICTIVE VALUE: NORMAL
REF LAB TEST METHOD: NORMAL
TEST PERFORMANCE INFO SPEC: NORMAL

## 2022-11-08 ENCOUNTER — TELEPHONE (OUTPATIENT)
Dept: OBSTETRICS AND GYNECOLOGY | Facility: CLINIC | Age: 22
End: 2022-11-08

## 2022-11-08 NOTE — TELEPHONE ENCOUNTER
----- Message from Estefany Irizarry MA sent at 11/7/2022  4:48 PM EST -----  L/m for pt/araceli  ----- Message -----  From: Rober Grayson MD  Sent: 11/7/2022   7:42 AM EST  To: ANA Mead, let her know the down syndrome  Test was negative/Normal. Thanks Dr. Grayson

## 2022-11-08 NOTE — TELEPHONE ENCOUNTER
"Pt aware \"Estefany, let her know the down syndrome  Test was negative/Normal. Thanks Dr. Grayson\"  "

## 2022-11-08 NOTE — TELEPHONE ENCOUNTER
Joshua Grayson,  Pt is having UTI symptoms of burning with urination and difficulty urinating. Can she come in to leave urine sample?    Pt also said she has a sore throat and coughing. I gave her the list of safe otc medications she can take.     Thanks,  Aleja

## 2022-11-11 ENCOUNTER — TELEPHONE (OUTPATIENT)
Dept: OBSTETRICS AND GYNECOLOGY | Facility: CLINIC | Age: 22
End: 2022-11-11

## 2022-11-11 ENCOUNTER — CLINICAL SUPPORT (OUTPATIENT)
Dept: OBSTETRICS AND GYNECOLOGY | Facility: CLINIC | Age: 22
End: 2022-11-11

## 2022-11-11 DIAGNOSIS — R39.9 UTI SYMPTOMS: Primary | ICD-10-CM

## 2022-11-11 LAB
BILIRUB BLD-MCNC: NEGATIVE MG/DL
GLUCOSE UR STRIP-MCNC: NEGATIVE MG/DL
KETONES UR QL: ABNORMAL
LEUKOCYTE EST, POC: NEGATIVE
NITRITE UR-MCNC: NEGATIVE MG/ML
PH UR: 6.5 [PH] (ref 5–8)
PROT UR STRIP-MCNC: NEGATIVE MG/DL
RBC # UR STRIP: NEGATIVE /UL
SP GR UR: 1.02 (ref 1–1.03)
UROBILINOGEN UR QL: NORMAL

## 2022-11-11 PROCEDURE — 81002 URINALYSIS NONAUTO W/O SCOPE: CPT | Performed by: OBSTETRICS & GYNECOLOGY

## 2022-11-11 NOTE — TELEPHONE ENCOUNTER
----- Message from Estefany Irizarry MA sent at 11/11/2022  3:02 PM EST -----  L/m for pt/araceli  ----- Message -----  From: Rober Grayson MD  Sent: 11/11/2022   1:30 PM EST  To: Estefany Irizarry MA    Estefany, mostly looks dehydrated. Drink lots of water. Will check culture to confirm if UTI present or not. Please let her know. Thanks, Dr. Grayson

## 2022-11-11 NOTE — TELEPHONE ENCOUNTER
Patient aware mostly dehydrated per Dr. Grayson urinalysis result. She asked to relay that when she coughs she is experiencing vaginal pain and burning with urination, I let her know Dr. Grayson checking whether UTI present or not.     Thanks!

## 2022-11-11 NOTE — PROGRESS NOTES
Estefany, mostly looks dehydrated. Drink lots of water. Will check culture to confirm if UTI present or not. Please let her know. Thanks, Dr. Grayson

## 2022-11-13 LAB
BACTERIA UR CULT: NORMAL
BACTERIA UR CULT: NORMAL

## 2022-12-07 ENCOUNTER — ROUTINE PRENATAL (OUTPATIENT)
Dept: OBSTETRICS AND GYNECOLOGY | Facility: CLINIC | Age: 22
End: 2022-12-07

## 2022-12-07 VITALS — SYSTOLIC BLOOD PRESSURE: 90 MMHG | WEIGHT: 119 LBS | BODY MASS INDEX: 21.77 KG/M2 | DIASTOLIC BLOOD PRESSURE: 56 MMHG

## 2022-12-07 DIAGNOSIS — Z34.82 ENCOUNTER FOR SUPERVISION OF OTHER NORMAL PREGNANCY IN SECOND TRIMESTER: Primary | ICD-10-CM

## 2022-12-07 DIAGNOSIS — O99.019 SICKLE CELL TRAIT IN MOTHER AFFECTING PREGNANCY: ICD-10-CM

## 2022-12-07 DIAGNOSIS — D57.3 SICKLE CELL TRAIT IN MOTHER AFFECTING PREGNANCY: ICD-10-CM

## 2022-12-07 DIAGNOSIS — Z36.89 ENCOUNTER FOR FETAL ANATOMIC SURVEY: ICD-10-CM

## 2022-12-07 DIAGNOSIS — O09.292 HISTORY OF POSTPARTUM HEMORRHAGE, CURRENTLY PREGNANT IN SECOND TRIMESTER: ICD-10-CM

## 2022-12-07 LAB
GLUCOSE UR STRIP-MCNC: NEGATIVE MG/DL
PROT UR STRIP-MCNC: ABNORMAL MG/DL

## 2022-12-07 PROCEDURE — 90686 IIV4 VACC NO PRSV 0.5 ML IM: CPT | Performed by: OBSTETRICS & GYNECOLOGY

## 2022-12-07 PROCEDURE — 99213 OFFICE O/P EST LOW 20 MIN: CPT | Performed by: OBSTETRICS & GYNECOLOGY

## 2022-12-07 PROCEDURE — 90471 IMMUNIZATION ADMIN: CPT | Performed by: OBSTETRICS & GYNECOLOGY

## 2022-12-07 NOTE — PROGRESS NOTES
OB follow up     Sarah Clifford is a 22 y.o.  15w6d being seen today for her obstetrical visit.  Patient reports no complaints. Fetal movement: absent.    Her prenatal care is complicated by (and status): Hx of PPH    Review of Systems  Cramping/contractions : none   Vaginal bleeding: none   Fetal movement good     BP 90/56   Wt 54 kg (119 lb)   LMP 2022 (Approximate)   BMI 21.77 kg/m²     FHT: 156 BPM   Uterine Size: 15 cm       Assessment    Diagnoses and all orders for this visit:    1. Encounter for supervision of other normal pregnancy in second trimester (Primary)    2. History of postpartum hemorrhage, currently pregnant in second trimester    3. Sickle cell trait in mother affecting pregnancy (HCC)  -     Urine Culture - , Urine, Clean Catch    4. Encounter for fetal anatomic survey  -     US Ob 14 + Weeks Single or First Gestation; Future        1) pregnancy at 15w6d   Quickening reviewed.   Flu shot recommended  Aneuploidy screen negative   Anatomy next visit.   2) Sickle cell trait  FOB    Urine culture each trimester  3) Hx of PPH         Plan    Reviewed this stage of pregnancy  Problem list updated   Follow up in 4 weeks    Rober Grayson MD   2022  10:16 EST

## 2022-12-09 ENCOUNTER — TELEPHONE (OUTPATIENT)
Dept: OBSTETRICS AND GYNECOLOGY | Facility: CLINIC | Age: 22
End: 2022-12-09

## 2022-12-09 LAB
BACTERIA UR CULT: NORMAL
BACTERIA UR CULT: NORMAL

## 2022-12-09 NOTE — TELEPHONE ENCOUNTER
Patient is returning call, aware of urine culture results. She is requesting to update and let provider know she had doctors appt today and was told weight has gone down, pt says she has gone from 119 to 117 due to throwing up and diarrhea. Pt would also like to let provider know she was prescribed medication, was unsure of name says she is going to call pharmacy.    Thank you,  Cheryl

## 2022-12-09 NOTE — TELEPHONE ENCOUNTER
Cheryl     If she is concerned about the Nausea and diarrhea, best to be seen in office and discuss her concerns. Please help her make arrangements. We should review medication from other provider.     Thanks,   Dr. Grayson

## 2022-12-09 NOTE — TELEPHONE ENCOUNTER
----- Message from Estefany Irizarry MA sent at 12/9/2022  3:22 PM EST -----  L/m for pt/araceli  ----- Message -----  From: Rober Grayson MD  Sent: 12/9/2022  11:11 AM EST  To: ANA Mead, normal urine culture. Please let her know. Thanks Dr. Grayson

## 2022-12-12 NOTE — TELEPHONE ENCOUNTER
Patient says she is feeling much better, says she did not take the medication that was prescribed to her. Pt says she will see us at next appt 1/4.    Cheryl

## 2023-01-02 ENCOUNTER — HOSPITAL ENCOUNTER (EMERGENCY)
Facility: HOSPITAL | Age: 23
Discharge: HOME | End: 2023-01-02
Attending: OBSTETRICS & GYNECOLOGY | Admitting: OBSTETRICS & GYNECOLOGY
Payer: COMMERCIAL

## 2023-01-02 ENCOUNTER — TELEPHONE (OUTPATIENT)
Dept: OBSTETRICS AND GYNECOLOGY | Facility: HOSPITAL | Age: 23
End: 2023-01-02
Payer: COMMERCIAL

## 2023-01-02 VITALS
TEMPERATURE: 98.7 F | HEIGHT: 63 IN | OXYGEN SATURATION: 100 % | HEART RATE: 71 BPM | BODY MASS INDEX: 21.44 KG/M2 | DIASTOLIC BLOOD PRESSURE: 67 MMHG | WEIGHT: 121 LBS | RESPIRATION RATE: 18 BRPM | SYSTOLIC BLOOD PRESSURE: 123 MMHG

## 2023-01-02 LAB
BACTERIA UR QL AUTO: ABNORMAL /HPF
BILIRUB UR QL STRIP: NEGATIVE
CLARITY UR: CLEAR
COLOR UR: YELLOW
GLUCOSE UR STRIP-MCNC: NEGATIVE MG/DL
HGB UR QL STRIP.AUTO: NEGATIVE
HYALINE CASTS UR QL AUTO: ABNORMAL /LPF
KETONES UR QL STRIP: NEGATIVE
LEUKOCYTE ESTERASE UR QL STRIP.AUTO: ABNORMAL
MUCOUS THREADS URNS QL MICRO: ABNORMAL /HPF
NITRITE UR QL STRIP: NEGATIVE
PH UR STRIP.AUTO: 7.5 [PH] (ref 5–8)
PROT UR QL STRIP: NEGATIVE
RBC # UR STRIP: ABNORMAL /HPF
REF LAB TEST METHOD: ABNORMAL
SP GR UR STRIP: 1.02 (ref 1–1.03)
SQUAMOUS #/AREA URNS HPF: ABNORMAL /HPF
UROBILINOGEN UR QL STRIP: ABNORMAL
WBC # UR STRIP: ABNORMAL /HPF

## 2023-01-02 PROCEDURE — 87086 URINE CULTURE/COLONY COUNT: CPT | Performed by: OBSTETRICS & GYNECOLOGY

## 2023-01-02 PROCEDURE — 99283 EMERGENCY DEPT VISIT LOW MDM: CPT | Performed by: OBSTETRICS & GYNECOLOGY

## 2023-01-02 PROCEDURE — 81001 URINALYSIS AUTO W/SCOPE: CPT | Performed by: OBSTETRICS & GYNECOLOGY

## 2023-01-02 NOTE — OBED NOTES
[facility] Note OB    Patient Name: Sarah Clifford  YOB: 2000  MRN: [id number]  Admission Date: 2023 11:19 AM  Date of Service: 2023    Chief Complaint: Vaginal Bleeding and Abdominal Pain (LAURA: pt came in with complaints of vaginal bleeding x1 this morning 0750, pt has since not had anymore vaginal bleeding but states that her entire abdomen hurts. She states that she also had one episode of diarrhea with bleeding. +FM, denies any loss of fluid. Denies sexual intercouse in the last 24 hours. )        Subjective     Sarah Clifford is a 22 y.o. female  at 19w4d with Estimated Date of Delivery: 23 who presents with the chief complaint listed above. Pt denies dysuria , fever, recent intercourse or constipation. No sick contacts that she knows of      She sees Rober Grayson MD for her prenatal care. Her pregnancy has been complicated by: RNI, SCT, hx od PPH    She describes fetal movement as normal.  She denies rupture of membranes.  She denies vaginal bleeding but not sure where spotting came from. She is not feeling contractions.        Objective   Patient Active Problem List    Diagnosis    • Maternal varicella, non-immune [O09.899, Z28.39]         OB History    Para Term  AB Living   3 1 1 0 1 1   SAB IAB Ectopic Molar Multiple Live Births   1 0 0 0 0 1      # Outcome Date GA Lbr Pedro/2nd Weight Sex Delivery Anes PTL Lv   3 Current            2 SAB 22 7w0d    SAB      1 Term 19 40w1d  2835 g (6 lb 4 oz) M Vag-Spont EPI N ARY      Complications: Postpartum Hemorrhage        No past medical history on file.    History reviewed. No pertinent surgical history.    No current facility-administered medications on file prior to encounter.     Current Outpatient Medications on File Prior to Encounter   Medication Sig Dispense Refill   • Pediatric Multivitamins-Iron (Cerovite Jr) 18 MG chewable tablet chewable tablet Chew 1 tablet Daily. Chew and swallow.      • Prenatal 28-0.8 MG tablet Take 1 tablet by mouth Daily. Please use formulary or generic, with DHA ideal 90 each 3   • ondansetron (Zofran) 4 MG tablet Take 1 tablet by mouth Daily As Needed for Nausea or Vomiting. 30 tablet 1   • promethazine (PHENERGAN) 25 MG tablet Take 1 tablet by mouth Every 6 (Six) Hours As Needed for Nausea or Vomiting. 30 tablet 2       No Known Allergies    Family History   Problem Relation Age of Onset   • Breast cancer Cousin    • Ovarian cancer Neg Hx    • Uterine cancer Neg Hx    • Colon cancer Neg Hx    • Deep vein thrombosis Neg Hx    • Pulmonary embolism Neg Hx        Social History     Socioeconomic History   • Marital status: Single   Tobacco Use   • Smoking status: Every Day     Types: Cigarettes   • Smokeless tobacco: Never   Substance and Sexual Activity   • Alcohol use: Not Currently   • Drug use: Not Currently   • Sexual activity: Yes     Partners: Male           Review of Systems   Constitutional: Negative for chills and fever.   HENT: Negative.    Eyes: Negative for photophobia and visual disturbance.   Respiratory: Negative for shortness of breath.    Cardiovascular: Negative for chest pain.   Gastrointestinal: Positive for abdominal pain. Negative for nausea.   Psychiatric/Behavioral: The patient is not nervous/anxious.           PHYSICAL EXAM:      VITAL SIGNS:  Vitals:    01/02/23 1139 01/02/23 1141 01/02/23 1146 01/02/23 1151   BP:       Pulse:  84 75 74   Resp:       Temp:       TempSrc:       SpO2:  100% 100% 100%   Height: 160 cm (63\")               FHT'S:                       Baseline:                             Beats/min:       Fetal HR (beats/min): 152                                             PHYSICAL EXAM:      General: well developed; well nourished  no acute distress   Heart: Not performed.   Lungs   breathing is unlabored   Abdomen: Gravid and non tender     Extremities: trace edema, DTRs 1 plus, no clonus       Cervix: Er RN, no blood noted on  glove  Cervical Dilation (cm): 0  Cervical Effacement: 0%  Fetal Station: -3  Cervical Consistency: firm  Cervical Position: posterior     Contractions:   none                    LABS AND TESTING ORDERED:  1. Uterine and fetal monitoring  2. Urinalysis      LAB RESULTS:    No results found for this or any previous visit (from the past 24 hour(s)).    Lab Results   Component Value Date    ABO O 09/19/2022    RH Positive 09/19/2022       No results found for: STREPGPB              External Prenatal Results     Pregnancy Outside Results - Transcribed From Office Records - See Scanned Records For Details     Test Value Date Time    ABO  O  09/19/22 1350    Rh  Positive  09/19/22 1350    Antibody Screen  Negative  09/19/22 1350    Varicella IgG  <135 index 02/23/22 1055    Rubella  1.84 index 09/19/22 1350    Hgb  11.9 g/dL 09/19/22 1350    Hct  37.6 % 09/19/22 1350    Glucose Fasting GTT       Glucose Tolerance Test 1 hour       Glucose Tolerance Test 3 hour       Gonorrhea (discrete)  Negative  09/19/22 0232    Chlamydia (discrete)  Negative  09/19/22 0232    RPR  Non Reactive  09/19/22 1350    VDRL       Syphilis Antibody ^ <0.10 (index_val) 12/07/18 0936    HBsAg  Negative  09/19/22 1350    Herpes Simplex Virus PCR       Herpes Simplex VIrus Culture       HIV  Non Reactive  09/19/22 1350    Hep C RNA Quant PCR       Hep C Antibody  <0.1 s/co ratio 09/19/22 1350    AFP       Group B Strep       GBS Susceptibility to Clindamycin       GBS Susceptibility to Erythromycin       Fetal Fibronectin       Genetic Testing, Maternal Blood             Drug Screening     Test Value Date Time    Urine Drug Screen       Amphetamine Screen ^ Negative (arb'U) 05/22/18 1314    Barbiturate Screen       Benzodiazepine Screen       Methadone Screen       Phencyclidine Screen       Opiates Screen ^ Negative  05/22/18 1314    THC Screen       Cocaine Screen       Propoxyphene Screen       Buprenorphine Screen       Methamphetamine Screen        Oxycodone Screen       Tricyclic Antidepressants Screen             Legend    ^: Historical                          Impression:   @ 19w4d .  Final Diagnosis: Pt reports potting, unknown etiology, OB exam bengn    Plan:  1. Discharge to home.    2. Plan of care has been reviewed with patient along with risks, benefits of treatment.   All questions have been answered. Call health care provider for any further concerns and keep office appointments.  3. Bleeding precautions, miscarriage precautions, comfort measures      I discussed the patients findings and my recommendations with patient, family and nursing staff      Milrded Mccarthy MD  2023  12:09 EST

## 2023-01-02 NOTE — TELEPHONE ENCOUNTER
Patient called the answering service with concerns about abdominal pain and diarrhea.  She reports symptoms began about 3 AM this morning, roughly 5 and half hours ago.  She states she has had persistent but intermittent abdominal pain since roughly 3 AM.  She also had multiple episodes of diarrhea.  She also reports having 1 episode of vaginal bleeding earlier this morning when going to the bathroom.  She says the bleeding has subsequently stopped but she is still having the abdominal pain.    I advised patient to come into [facility] labor and delivery for evaluation.  She verbalized understanding and stated she would plan to come to the hospital.

## 2023-01-03 LAB — BACTERIA SPEC AEROBE CULT: NORMAL

## 2023-01-04 ENCOUNTER — TELEPHONE (OUTPATIENT)
Dept: OBSTETRICS AND GYNECOLOGY | Facility: CLINIC | Age: 23
End: 2023-01-04

## 2023-01-04 ENCOUNTER — ROUTINE PRENATAL (OUTPATIENT)
Dept: OBSTETRICS AND GYNECOLOGY | Facility: CLINIC | Age: 23
End: 2023-01-04
Payer: COMMERCIAL

## 2023-01-04 VITALS — DIASTOLIC BLOOD PRESSURE: 62 MMHG | SYSTOLIC BLOOD PRESSURE: 96 MMHG | BODY MASS INDEX: 21.08 KG/M2 | WEIGHT: 119 LBS

## 2023-01-04 DIAGNOSIS — O99.019 SICKLE CELL TRAIT IN MOTHER AFFECTING PREGNANCY: ICD-10-CM

## 2023-01-04 DIAGNOSIS — O09.292 HISTORY OF POSTPARTUM HEMORRHAGE, CURRENTLY PREGNANT IN SECOND TRIMESTER: ICD-10-CM

## 2023-01-04 DIAGNOSIS — O35.HXX0 CLUB FOOT OF FETUS AFFECTING ANTEPARTUM CARE OF MOTHER, SINGLE OR UNSPECIFIED FETUS: ICD-10-CM

## 2023-01-04 DIAGNOSIS — Z34.82 ENCOUNTER FOR SUPERVISION OF OTHER NORMAL PREGNANCY IN SECOND TRIMESTER: Primary | ICD-10-CM

## 2023-01-04 DIAGNOSIS — D57.3 SICKLE CELL TRAIT IN MOTHER AFFECTING PREGNANCY: ICD-10-CM

## 2023-01-04 DIAGNOSIS — O35.9XX0 SUSPECTED FETAL ANOMALY, ANTEPARTUM, SINGLE OR UNSPECIFIED FETUS: ICD-10-CM

## 2023-01-04 LAB
GLUCOSE UR STRIP-MCNC: NEGATIVE MG/DL
PROT UR STRIP-MCNC: ABNORMAL MG/DL

## 2023-01-04 PROCEDURE — 99213 OFFICE O/P EST LOW 20 MIN: CPT | Performed by: OBSTETRICS & GYNECOLOGY

## 2023-01-04 RX ORDER — CETIRIZINE HYDROCHLORIDE 10 MG/1
TABLET ORAL
COMMUNITY
Start: 2022-12-09 | End: 2023-03-20

## 2023-01-04 RX ORDER — FAMOTIDINE 40 MG/1
TABLET, FILM COATED ORAL
COMMUNITY
Start: 2022-12-09 | End: 2023-03-20

## 2023-01-04 NOTE — TELEPHONE ENCOUNTER
----- Message from Rober Grayson MD sent at 1/3/2023  7:23 AM EST -----  Estefany, seen in OB ED yesterday. Getting urine cultured. Awaiting results. Thanks, Dr. Grayson

## 2023-01-04 NOTE — TELEPHONE ENCOUNTER
----- Message from Roebr Grayson MD sent at 1/3/2023 10:25 AM EST -----  Estefany, normal urine culture. Please let her know. Thanks Dr. Grayson

## 2023-01-04 NOTE — PROGRESS NOTES
OB follow up     Sarah Clifford is a 22 y.o.  19w6d being seen today for her obstetrical visit.  Patient reports no complaints. Fetal movement: normal.    Her prenatal care is complicated by (and status): Hx of PPH    Review of Systems  Cramping/contractions : none   Vaginal bleeding: none   Fetal movement good     BP 96/62   Wt 54 kg (119 lb)   LMP 2022 (Approximate)   BMI 21.08 kg/m²     FHT: 156 BPM   Uterine Size: 20 cm       Assessment    Diagnoses and all orders for this visit:    1. Encounter for supervision of other normal pregnancy in second trimester (Primary)    2. History of postpartum hemorrhage, currently pregnant in second trimester    3. Sickle cell trait in mother affecting pregnancy (HCC)    4. Club foot of fetus affecting antepartum care of mother, single or unspecified fetus  -     [facility]  -     Ambulatory Referral to [facility] ([facility])    5. Suspected fetal anomaly, antepartum, single or unspecified fetus  -     [facility]  -     Ambulatory Referral to [facility] ([facility])        1) pregnancy at 19w6d   Anatomy scan reviewed.   Normal except for possible left club foot  Will have MFM look and determine if present, uncertain   2) Hx of PPH   3) Sickle cell trait   Recent normal urine culture     Plan    Reviewed this stage of pregnancy  Problem list updated   Follow up in 4 weeks    Rober Grayson MD   2023  13:12 EST

## 2023-01-20 ENCOUNTER — OFFICE VISIT (OUTPATIENT)
Dept: OBSTETRICS AND GYNECOLOGY | Facility: CLINIC | Age: 23
End: 2023-01-20
Payer: COMMERCIAL

## 2023-01-20 ENCOUNTER — HOSPITAL ENCOUNTER (OUTPATIENT)
Dept: ULTRASOUND IMAGING | Facility: HOSPITAL | Age: 23
Discharge: HOME | End: 2023-01-20
Admitting: OBSTETRICS & GYNECOLOGY
Payer: COMMERCIAL

## 2023-01-20 VITALS
HEART RATE: 77 BPM | BODY MASS INDEX: 21.47 KG/M2 | WEIGHT: 121.2 LBS | SYSTOLIC BLOOD PRESSURE: 103 MMHG | TEMPERATURE: 98 F | DIASTOLIC BLOOD PRESSURE: 72 MMHG

## 2023-01-20 DIAGNOSIS — Z03.73 FETAL ANOMALY SUSPECTED BUT NOT FOUND: Primary | ICD-10-CM

## 2023-01-20 PROCEDURE — 76811 OB US DETAILED SNGL FETUS: CPT | Performed by: OBSTETRICS & GYNECOLOGY

## 2023-01-20 PROCEDURE — 99213 OFFICE O/P EST LOW 20 MIN: CPT | Performed by: OBSTETRICS & GYNECOLOGY

## 2023-01-20 PROCEDURE — 76811 OB US DETAILED SNGL FETUS: CPT

## 2023-01-20 NOTE — LETTER
2023     Rober Grayson MD  [address]  [address]  [facility] [address]    Patient: Sarah Clifford   YOB: 2000   Date of Visit: 2023       Dear Rober Grayson MD,    Thank you for referring Sarah Clifford to me for evaluation. Below is a copy of my consult note.    If you have questions, please do not hesitate to call me. I look forward to following Sarah along with you.         Sincerely,        Amber Treviño MD        MATERNAL FETAL MEDICINE Consult Note    Dear Dr Rober Grayson MD:    Thank you for your kind referral of Sarah Clifford.  As you know, she is a 22 y.o.   at  22 3/7 weeks gestation (Estimated Date of Delivery: 23). This is a consult.      Her antepartum course is complicated by:  Suspected club foot, not seen today    Aneuploidy Screening: low risk cell free DNA    HPI: Today, she denies headache, blurry vision, RUQ pain. No vaginal bleeding, no contractions.     Review of History:  Past Medical History:   Diagnosis Date   • Dizziness    • PPH (postpartum hemorrhage) 2019    unsure if needed blood transfusion     Past Surgical History:   Procedure Laterality Date   • WISDOM TOOTH EXTRACTION         Social History     Socioeconomic History   • Marital status: Single   Tobacco Use   • Smoking status: Former     Packs/day: 1.00     Types: Cigarettes     Quit date:      Years since quittin.0     Passive exposure: Current   • Smokeless tobacco: Never   • Tobacco comments:     Wants to quit   Vaping Use   • Vaping Use: Every day   • Substances: Nicotine   • Devices: Disposable   Substance and Sexual Activity   • Alcohol use: Not Currently   • Drug use: Not Currently   • Sexual activity: Yes     Partners: Male     Family History   Problem Relation Age of Onset   • Breast cancer Cousin    • Ovarian cancer Neg Hx    • Uterine cancer Neg Hx    • Colon cancer Neg Hx    • Pulmonary embolism Neg Hx       No Known Allergies   Current  Outpatient Medications on File Prior to Visit   Medication Sig Dispense Refill   • Pediatric Multivitamins-Iron (Cerovite Jr) 18 MG chewable tablet chewable tablet Chew 1 tablet Daily. Chew and swallow.     • Prenatal 28-0.8 MG tablet Take 1 tablet by mouth Daily. Please use formulary or generic, with DHA ideal 90 each 3   • cetirizine (zyrTEC) 10 MG tablet      • famotidine (PEPCID) 40 MG tablet      • ondansetron (Zofran) 4 MG tablet Take 1 tablet by mouth Daily As Needed for Nausea or Vomiting. 30 tablet 1   • promethazine (PHENERGAN) 25 MG tablet Take 1 tablet by mouth Every 6 (Six) Hours As Needed for Nausea or Vomiting. 30 tablet 2     No current facility-administered medications on file prior to visit.        Past obstetric, gynecological, medical, surgical, family and social history reviewed.  Relevant lab work and imaging reviewed.    Review of systems  Constitutional:  denies fever, chills, malaise.   ENT/Mouth:  denies sore throat, tinnitis  Eyes: denies vision changes/pain  CV:  denies chest pain  Respiratory:  denies cough/SOB  GI:  denies N/V, diarrhea, abdominal pain.    :   denies dysuria  Skin:  denies lesions or pruritis   Neuro:  denies weakness, focal neurologic symptoms    Vitals:    01/20/23 1105   BP: 103/72   BP Location: Right arm   Patient Position: Sitting   Pulse: 77   Temp: 98 °F (36.7 °C)   TempSrc: Temporal   Weight: 55 kg (121 lb 3.2 oz)       PHYSICAL EXAM   GENERAL: Not in acute distress, AAOx3, pleasant  CARDIO: regular rate and rhythm  PULM: symmetric chest rise, speaking in complete sentences without difficulty  NEURO: awake, alert and oriented to person, place, and time  ABDOMINAL: No fundal tenderness, no rebound or guarding, gravid  EXTREMITIES: no bilateral lower extremity edema/tenderness  SKIN: Warm, well-perfused    ULTRASOUND   Please view full ultrasound note on Imaging tab in View[employer].  Cephalic presentation.  Fundal placenta.  MVP 6.3 cm, which is normal.     g (34% AC 40%)  Normal appearing anatomy.  Feet appear normal and not clubbed on multiple views.      ASSESSMENT/COUNSELIN y.o.   at  22 3/7 weeks gestation (Estimated Date of Delivery: 23).     -Pregnancy  [ X ] stable  [   ] improving [  ] worsening    Diagnoses and all orders for this visit:    1. Fetal anomaly suspected but not found (Primary)       Club foot, not found:  We were able to see both fetal feet on multiple views     It consists of a structural abnormality causing an inversion of the forefoot and heel.  In a fetus with clubfoot, the long axis of the foot (the sole) and the tibia can be seen in the same plane on ultrasound.  We were able to see the fetal feet in multiple sweeps and were never able to get this view.  The feet looked very normal in comparison to the fetal tibia, thankfully.  I was able to give the patient this good news.  Certainly, US isn't perfect and can miss anomalies, but I have a low suspicion for club foot at this time.      Summary of Plan  -Continue follow up with primary OB    Follow-up: No follow up with MFM scheduled, but I am happy to see for follow up at request of primary obstetrician    Thank you for the consult and opportunity to care for this patient.  Please feel free to reach out with any questions or concerns.      I spent 15 minutes caring for this patient on this date of service. This time includes time spent by me in the following activities: preparing for the visit, reviewing tests, obtaining and/or reviewing a separately obtained history, performing a medically appropriate examination and/or evaluation, counseling and educating the patient/family/caregiver and independently interpreting results and communicating that information with the patient/family/caregiver with greater than 50% spent in counseling and coordination of care.     I spent 7 minutes on the separately reported service of US imaging not included in the time used to support  the E/M service also reported today.      Amber Treviño MD [facility] [place]etal [facility]  Office: [phone]  [email or web address]

## 2023-01-20 NOTE — PROGRESS NOTES
"Pt reports that she is doing well and denies vaginal bleeding or LOF at this time. Reports active fetal movement. 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 After these long hours pt reports feeling some abdominal pain in her sides and occasional vaginal discomfort. Reviewed with patient to call OB office with any concerns of contractions or pressure. Pt verbalizes understanding. Reports that she has been lightheaded and dizzy frequently. Notes a recent event of getting lightheaded, having visual changes, feeling like the room was spinning, finding a chair to sit down in and then \"waking up\" with her coworkers around her. Reports this happened 2 weeks ago and has not had a episode since. Will discuss with Dr. Treviño. Denies CLARK, visual changes or epigastric pain at this time. Next OB appointment scheduled for 1/30.    Vitals:    01/20/23 1105   BP: 103/72   Pulse: 77   Temp: 98 °F (36.7 °C)        "

## 2023-01-20 NOTE — PROGRESS NOTES
MATERNAL FETAL MEDICINE Consult Note    Dear Dr Rober Grayson MD:    Thank you for your kind referral of Sarah Clifford.  As you know, she is a 22 y.o.   at  22 3/7 weeks gestation (Estimated Date of Delivery: 23). This is a consult.      Her antepartum course is complicated by:  Suspected club foot, not seen today    Aneuploidy Screening: low risk cell free DNA    HPI: Today, she denies headache, blurry vision, RUQ pain. No vaginal bleeding, no contractions.     Review of History:  Past Medical History:   Diagnosis Date   • Dizziness    • PPH (postpartum hemorrhage) 2019    unsure if needed blood transfusion     Past Surgical History:   Procedure Laterality Date   • WISDOM TOOTH EXTRACTION         Social History     Socioeconomic History   • Marital status: Single   Tobacco Use   • Smoking status: Former     Packs/day: 1.00     Types: Cigarettes     Quit date:      Years since quittin.0     Passive exposure: Current   • Smokeless tobacco: Never   • Tobacco comments:     Wants to quit   Vaping Use   • Vaping Use: Every day   • Substances: Nicotine   • Devices: Disposable   Substance and Sexual Activity   • Alcohol use: Not Currently   • Drug use: Not Currently   • Sexual activity: Yes     Partners: Male     Family History   Problem Relation Age of Onset   • Breast cancer Cousin    • Ovarian cancer Neg Hx    • Uterine cancer Neg Hx    • Colon cancer Neg Hx    • Pulmonary embolism Neg Hx       No Known Allergies   Current Outpatient Medications on File Prior to Visit   Medication Sig Dispense Refill   • Pediatric Multivitamins-Iron (Cerovite Jr) 18 MG chewable tablet chewable tablet Chew 1 tablet Daily. Chew and swallow.     • Prenatal 28-0.8 MG tablet Take 1 tablet by mouth Daily. Please use formulary or generic, with DHA ideal 90 each 3   • cetirizine (zyrTEC) 10 MG tablet      • famotidine (PEPCID) 40 MG tablet      • ondansetron (Zofran) 4 MG tablet Take 1 tablet by mouth Daily As  Needed for Nausea or Vomiting. 30 tablet 1   • promethazine (PHENERGAN) 25 MG tablet Take 1 tablet by mouth Every 6 (Six) Hours As Needed for Nausea or Vomiting. 30 tablet 2     No current facility-administered medications on file prior to visit.        Past obstetric, gynecological, medical, surgical, family and social history reviewed.  Relevant lab work and imaging reviewed.    Review of systems  Constitutional:  denies fever, chills, malaise.   ENT/Mouth:  denies sore throat, tinnitis  Eyes: denies vision changes/pain  CV:  denies chest pain  Respiratory:  denies cough/SOB  GI:  denies N/V, diarrhea, abdominal pain.    :   denies dysuria  Skin:  denies lesions or pruritis   Neuro:  denies weakness, focal neurologic symptoms    Vitals:    23 1105   BP: 103/72   BP Location: Right arm   Patient Position: Sitting   Pulse: 77   Temp: 98 °F (36.7 °C)   TempSrc: Temporal   Weight: 55 kg (121 lb 3.2 oz)       PHYSICAL EXAM   GENERAL: Not in acute distress, AAOx3, pleasant  CARDIO: regular rate and rhythm  PULM: symmetric chest rise, speaking in complete sentences without difficulty  NEURO: awake, alert and oriented to person, place, and time  ABDOMINAL: No fundal tenderness, no rebound or guarding, gravid  EXTREMITIES: no bilateral lower extremity edema/tenderness  SKIN: Warm, well-perfused    ULTRASOUND   Please view full ultrasound note on Imaging tab in ViewPoint.  Cephalic presentation.  Fundal placenta.  MVP 6.3 cm, which is normal.    g (34% AC 40%)  Normal appearing anatomy.  Feet appear normal and not clubbed on multiple views.      ASSESSMENT/COUNSELIN y.o.   at  22 3/7 weeks gestation (Estimated Date of Delivery: 23).     -Pregnancy  [ X ] stable  [   ] improving [  ] worsening    Diagnoses and all orders for this visit:    1. Fetal anomaly suspected but not found (Primary)       Club foot, not found:  We were able to see both fetal feet on multiple views     It consists of  a structural abnormality causing an inversion of the forefoot and heel.  In a fetus with clubfoot, the long axis of the foot (the sole) and the tibia can be seen in the same plane on ultrasound.  We were able to see the fetal feet in multiple sweeps and were never able to get this view.  The feet looked very normal in comparison to the fetal tibia, thankfully.  I was able to give the patient this good news.  Certainly, US isn't perfect and can miss anomalies, but I have a low suspicion for club foot at this time.      Summary of Plan  -Continue follow up with primary OB    Follow-up: No follow up with MFM scheduled, but I am happy to see for follow up at request of primary obstetrician    Thank you for the consult and opportunity to care for this patient.  Please feel free to reach out with any questions or concerns.      I spent 15 minutes caring for this patient on this date of service. This time includes time spent by me in the following activities: preparing for the visit, reviewing tests, obtaining and/or reviewing a separately obtained history, performing a medically appropriate examination and/or evaluation, counseling and educating the patient/family/caregiver and independently interpreting results and communicating that information with the patient/family/caregiver with greater than 50% spent in counseling and coordination of care.     I spent 7 minutes on the separately reported service of US imaging not included in the time used to support the E/M service also reported today.      Amber Treviño MD FACOG  Maternal Fetal Medicine-[facility]  Office: [phone]  [email or web address]

## 2023-02-10 ENCOUNTER — ROUTINE PRENATAL (OUTPATIENT)
Dept: OBSTETRICS AND GYNECOLOGY | Facility: CLINIC | Age: 23
End: 2023-02-10
Payer: COMMERCIAL

## 2023-02-10 ENCOUNTER — REFERRAL TRIAGE (OUTPATIENT)
Dept: LABOR AND DELIVERY | Facility: HOSPITAL | Age: 23
End: 2023-02-10
Payer: COMMERCIAL

## 2023-02-10 VITALS — BODY MASS INDEX: 21.97 KG/M2 | SYSTOLIC BLOOD PRESSURE: 92 MMHG | WEIGHT: 124 LBS | DIASTOLIC BLOOD PRESSURE: 58 MMHG

## 2023-02-10 DIAGNOSIS — O09.292 HISTORY OF POSTPARTUM HEMORRHAGE, CURRENTLY PREGNANT IN SECOND TRIMESTER: ICD-10-CM

## 2023-02-10 DIAGNOSIS — Z36.9 ANTENATAL SCREENING ENCOUNTER: ICD-10-CM

## 2023-02-10 DIAGNOSIS — Z34.82 ENCOUNTER FOR SUPERVISION OF OTHER NORMAL PREGNANCY IN SECOND TRIMESTER: Primary | ICD-10-CM

## 2023-02-10 DIAGNOSIS — D57.3 SICKLE CELL TRAIT IN MOTHER AFFECTING PREGNANCY: ICD-10-CM

## 2023-02-10 DIAGNOSIS — O99.019 SICKLE CELL TRAIT IN MOTHER AFFECTING PREGNANCY: ICD-10-CM

## 2023-02-10 LAB
GLUCOSE UR STRIP-MCNC: NEGATIVE MG/DL
PROT UR STRIP-MCNC: NEGATIVE MG/DL

## 2023-02-10 PROCEDURE — 99213 OFFICE O/P EST LOW 20 MIN: CPT | Performed by: OBSTETRICS & GYNECOLOGY

## 2023-02-10 NOTE — PROGRESS NOTES
OB follow up     Sarah Clifford is a 22 y.o.  25w1d being seen today for her obstetrical visit.  Patient reports no complaints. Fetal movement: normal.    Her prenatal care is complicated by (and status): Hx of PPH    Review of Systems  Cramping/contractions : none  Vaginal bleeding: none   Fetal movement good     BP 92/58   Wt 56.2 kg (124 lb)   LMP 2022 (Approximate)   BMI 21.97 kg/m²     FHT: 134 BPM   Uterine Size: 22 cm       Assessment    Diagnoses and all orders for this visit:    1. Encounter for supervision of other normal pregnancy in second trimester (Primary)    2. History of postpartum hemorrhage, currently pregnant in second trimester    3. Sickle cell trait in mother affecting pregnancy (HCC)    4.  screening encounter  -     CBC & Differential  -     Gestational Screen 1 Hr ([facility])        1) pregnancy at 25w1d   Rh+, GTT/CBC ordered   Growth AGA at MFM, encouraged weight gain   No club foot found at MFM   2) Hx of PPH  3) Sickle cell trait  Normal urine culture         Plan    Reviewed this stage of pregnancy  Problem list updated   Follow up in 2 weeks    Rober Grayson MD   2/10/2023  10:36 EST

## 2023-02-13 ENCOUNTER — PATIENT OUTREACH (OUTPATIENT)
Dept: LABOR AND DELIVERY | Facility: HOSPITAL | Age: 23
End: 2023-02-13
Payer: COMMERCIAL

## 2023-02-13 NOTE — OUTREACH NOTE
[facility]  Enrollment    Current Estimated Gestational Age: 25w4d    Questions/Answers    Flowsheet Row Responses   Would like to participate? Yes   Date of Intake Visit 02/14/23          Mehreen Hernandez RN  Maternity Nurse Navigator    2/13/2023, 14:29 EST

## 2023-02-14 ENCOUNTER — PATIENT OUTREACH (OUTPATIENT)
Dept: LABOR AND DELIVERY | Facility: HOSPITAL | Age: 23
End: 2023-02-14
Payer: COMMERCIAL

## 2023-02-14 NOTE — OUTREACH NOTE
[facility]  Unable to Reach       Questions/Answers    Flowsheet Row Responses   Pending Outreach Intake Visit   Call Attempt First   Outcome Left message   Next Call Attempt Date 02/15/23          Mehreen Hernandez RN  Maternity Nurse Navigator    2/14/2023, 09:26 EST

## 2023-02-15 ENCOUNTER — PATIENT OUTREACH (OUTPATIENT)
Dept: LABOR AND DELIVERY | Facility: HOSPITAL | Age: 23
End: 2023-02-15
Payer: COMMERCIAL

## 2023-02-15 NOTE — OUTREACH NOTE
[facility]  Intake    Current Estimated Gestational Age: 25w6d    Intake Assessment    Flowsheet Row Responses   Best Method for Contacting Cell   Currently Employed Yes  [[employer] ]   Able to keep appointments as scheduled Yes   Gender(s) and Name(s) boy   Do you have a dentist? Yes   Dentist Name Odin   Have you seen a dentist in the last 6 months No   Next Appointment: --  [will schedule]   Resources Presently Utilizing: WIC (Women, Infant, Children), HANDS, Other   Other Resources Utilizing: SNAP   Maternal Warning Signs Provided   Other Education Transportation Assistance, Insurance benefits/Incentives, HANDS, WIC Benefits          Learning Assessment    Flowsheet Row Responses   Relationship Patient   Learner Name Sarah   Does the learner have any barriers to learning? No Barriers   What is the preferred language of the learner for medical teaching? English   Is an  required? No   How does the learner prefer to learn new concepts? Listening, Reading, Demonstration, Pictures/Video        Pt doing well. She is already enrolled in WIC and has contacted [facility] for this pregnancy. Stable housing and transportation. Still needs infant supplies, reviewed car seat benefit from [employer] and send flyer for maternity pantry. Reviewed maternal warning signs. F/u in three weeks.     Tobacco, Alcohol, and Drug History     reports that she quit smoking about 13 months ago. Her smoking use included cigarettes. She smoked an average of 1 pack per day. She has been exposed to tobacco smoke. She has never used smokeless tobacco.   reports that she does not currently use alcohol.   reports that she does not currently use drugs.    Mehreen Hernandez RN  Maternity Nurse Navigator    2/15/2023, 11:48 EST

## 2023-03-01 ENCOUNTER — ROUTINE PRENATAL (OUTPATIENT)
Dept: OBSTETRICS AND GYNECOLOGY | Facility: CLINIC | Age: 23
End: 2023-03-01
Payer: COMMERCIAL

## 2023-03-01 VITALS — SYSTOLIC BLOOD PRESSURE: 99 MMHG | WEIGHT: 125 LBS | DIASTOLIC BLOOD PRESSURE: 57 MMHG | BODY MASS INDEX: 22.14 KG/M2

## 2023-03-01 DIAGNOSIS — D57.3 SICKLE CELL TRAIT IN MOTHER AFFECTING PREGNANCY: ICD-10-CM

## 2023-03-01 DIAGNOSIS — O99.019 SICKLE CELL TRAIT IN MOTHER AFFECTING PREGNANCY: ICD-10-CM

## 2023-03-01 DIAGNOSIS — O09.292 HISTORY OF POSTPARTUM HEMORRHAGE, CURRENTLY PREGNANT IN SECOND TRIMESTER: ICD-10-CM

## 2023-03-01 DIAGNOSIS — Z34.82 ENCOUNTER FOR SUPERVISION OF OTHER NORMAL PREGNANCY IN SECOND TRIMESTER: Primary | ICD-10-CM

## 2023-03-01 LAB
GLUCOSE 1H P 50 G GLC PO SERPL-MCNC: 133 MG/DL (ref 65–139)
GLUCOSE UR STRIP-MCNC: NEGATIVE MG/DL
PROT UR STRIP-MCNC: NEGATIVE MG/DL

## 2023-03-01 PROCEDURE — 99214 OFFICE O/P EST MOD 30 MIN: CPT | Performed by: OBSTETRICS & GYNECOLOGY

## 2023-03-01 NOTE — PROGRESS NOTES
OB follow up     Sarah Clifford is a 22 y.o.  27w6d being seen today for her obstetrical visit.  Patient reports lactating breast.  Fetal movement: normal.    Her prenatal care is complicated by (and status): Hx of PPH    Review of Systems  Cramping/contractions : none   Vaginal bleeding: none   Fetal movement good     BP 99/57   Wt 56.7 kg (125 lb)   LMP 2022 (Approximate)   BMI 22.14 kg/m²     FHT: 144 BPM   Uterine Size: 25 cm       Assessment    Diagnoses and all orders for this visit:    1. Encounter for supervision of other normal pregnancy in second trimester (Primary)    2. History of postpartum hemorrhage, currently pregnant in second trimester    3. Sickle cell trait in mother affecting pregnancy (HCC)        1) pregnancy at 27w6d   Rh+, GTT/CBC today  Peds, prenatal classes and tours   TDaP and flu boosters encouraged   Encouraged questions about Peds, prenatal classes and tours   2) Hx of PPH  Risk factor   3) Sickle cell trait   Normal urine culture     Leaking breast milk   Stressed not to pump       Plan    Reviewed this stage of pregnancy  Problem list updated   Follow up in 2 weeks    Rober Grayson MD   3/1/2023  12:04 EST

## 2023-03-02 ENCOUNTER — TELEPHONE (OUTPATIENT)
Dept: OBSTETRICS AND GYNECOLOGY | Facility: CLINIC | Age: 23
End: 2023-03-02
Payer: COMMERCIAL

## 2023-03-02 LAB
BASOPHILS # BLD AUTO: 0.03 10*3/MM3 (ref 0–0.2)
BASOPHILS NFR BLD AUTO: 0.5 % (ref 0–1.5)
EOSINOPHIL # BLD AUTO: 0.04 10*3/MM3 (ref 0–0.4)
EOSINOPHIL NFR BLD AUTO: 0.7 % (ref 0.3–6.2)
ERYTHROCYTE [DISTWIDTH] IN BLOOD BY AUTOMATED COUNT: 16 % (ref 12.3–15.4)
HCT VFR BLD AUTO: 33 % (ref 34–46.6)
HGB BLD-MCNC: 10.8 G/DL (ref 12–15.9)
IMM GRANULOCYTES # BLD AUTO: 0.02 10*3/MM3 (ref 0–0.05)
IMM GRANULOCYTES NFR BLD AUTO: 0.3 % (ref 0–0.5)
LYMPHOCYTES # BLD AUTO: 1.75 10*3/MM3 (ref 0.7–3.1)
LYMPHOCYTES NFR BLD AUTO: 29.5 % (ref 19.6–45.3)
MCH RBC QN AUTO: 22.7 PG (ref 26.6–33)
MCHC RBC AUTO-ENTMCNC: 32.7 G/DL (ref 31.5–35.7)
MCV RBC AUTO: 69.5 FL (ref 79–97)
MONOCYTES # BLD AUTO: 0.41 10*3/MM3 (ref 0.1–0.9)
MONOCYTES NFR BLD AUTO: 6.9 % (ref 5–12)
NEUTROPHILS # BLD AUTO: 3.69 10*3/MM3 (ref 1.7–7)
NEUTROPHILS NFR BLD AUTO: 62.1 % (ref 42.7–76)
NRBC BLD AUTO-RTO: 0 /100 WBC (ref 0–0.2)
PLATELET # BLD AUTO: 297 10*3/MM3 (ref 140–450)
RBC # BLD AUTO: 4.75 10*6/MM3 (ref 3.77–5.28)
WBC # BLD AUTO: 5.94 10*3/MM3 (ref 3.4–10.8)

## 2023-03-02 RX ORDER — FERROUS SULFATE 325(65) MG
325 TABLET ORAL
Qty: 30 TABLET | Refills: 6 | Status: SHIPPED | OUTPATIENT
Start: 2023-03-02

## 2023-03-02 NOTE — TELEPHONE ENCOUNTER
----- Message from Estefany Irizarry MA sent at 3/2/2023  3:36 PM EST -----  Both numbers are not in service/araceli  ----- Message -----  From: Rober Grayson MD  Sent: 3/2/2023  12:20 PM EST  To: ANA Mead, Anemic on her BS/CBC screen. I sent in Iron to treat. Failed her glucose test. Needs to do 3 hour. Please let her know. Thanks, Dr. Grayson

## 2023-03-08 ENCOUNTER — PATIENT OUTREACH (OUTPATIENT)
Dept: LABOR AND DELIVERY | Facility: HOSPITAL | Age: 23
End: 2023-03-08
Payer: COMMERCIAL

## 2023-03-08 NOTE — OUTREACH NOTE
[facility]  Check-In    Current Estimated Gestational Age: 28w6d    Questions/Answers    Flowsheet Row Responses   Best Method for Contacting Home   Demographics Reviewed Yes   Currently Employed Yes   Able to keep appointments as scheduled Yes   Gender(s) and Name(s) boy   Baby Active/Feeling Fetal Movemen Yes   How are you presently feeling? good, lots of fetal movement   Questions regarding prenatal visits or tests to be ordered? No   May I ask you questions about your substance use? Yes   Other Comment denies   Supplies ready for baby Clothing, Crib, Feeding Supplies   Resource/Environmental Concerns None   Do you have any questions related to your care experience, your pregnancy, plans for delivery, any concerns, etc? No   Other Education Insurance benefits/Incentives        Pt doing well, discussed three hour glucose test. Reviewed fetal kick counts and she reports lots of fetal movement. She does have some infant supplies, she will call soon about car seat and breast pump. F/u around 35 weeks.     Mehreen Hernandez RN  Maternity Nurse Navigator    3/8/2023, 10:31 EST

## 2023-03-09 ENCOUNTER — TELEPHONE (OUTPATIENT)
Dept: OBSTETRICS AND GYNECOLOGY | Facility: CLINIC | Age: 23
End: 2023-03-09
Payer: COMMERCIAL

## 2023-03-09 NOTE — TELEPHONE ENCOUNTER
Estefany,     1) Back pain in pregnancy is common. If low back pain, recommend intermittent heat (warm bath), tylenol, and maternity belt.   If abdomen tightening or cramping and pain comes and goes. Should time the pains. If Every 8 minutes or less for more than one hour, should go to OB ED for evaluation for  labor.     2) Breast pain is common, suggest supportive garments (larger bra or supportive sports bra.     3) So decreased fetal movement is not a great term. Many times fetal movement can change over course of the pregnancy   - If concerned about number or character of movement.   Should have something sweet, NOT CAFFEINE   - lay on the side with no distractions and a timer  Count the movement over the next hour.     Greater than 5 movements, is reassuring and fine  Less than or equal to 4 movements, should go to OB ED at the hospital.     That is what we would recommend.   We do not have available slots to see her today and can not wait for these particular complaints.     So give instructions as above.     Thanks,   Dr. Grayson

## 2023-03-20 ENCOUNTER — ROUTINE PRENATAL (OUTPATIENT)
Dept: OBSTETRICS AND GYNECOLOGY | Facility: CLINIC | Age: 23
End: 2023-03-20
Payer: COMMERCIAL

## 2023-03-20 VITALS — WEIGHT: 124 LBS | SYSTOLIC BLOOD PRESSURE: 105 MMHG | BODY MASS INDEX: 21.97 KG/M2 | DIASTOLIC BLOOD PRESSURE: 64 MMHG

## 2023-03-20 DIAGNOSIS — Z34.83 ENCOUNTER FOR SUPERVISION OF OTHER NORMAL PREGNANCY IN THIRD TRIMESTER: Primary | ICD-10-CM

## 2023-03-20 DIAGNOSIS — O99.013 ANEMIA DURING PREGNANCY IN THIRD TRIMESTER: ICD-10-CM

## 2023-03-20 DIAGNOSIS — O09.293 HISTORY OF POSTPARTUM HEMORRHAGE, CURRENTLY PREGNANT IN THIRD TRIMESTER: ICD-10-CM

## 2023-03-20 DIAGNOSIS — D57.3 SICKLE CELL TRAIT IN MOTHER AFFECTING PREGNANCY: ICD-10-CM

## 2023-03-20 DIAGNOSIS — O26.843 FUNDAL HEIGHT LOW FOR DATES IN THIRD TRIMESTER: ICD-10-CM

## 2023-03-20 DIAGNOSIS — O99.019 SICKLE CELL TRAIT IN MOTHER AFFECTING PREGNANCY: ICD-10-CM

## 2023-03-20 LAB
GLUCOSE UR STRIP-MCNC: NEGATIVE MG/DL
PROT UR STRIP-MCNC: NEGATIVE MG/DL

## 2023-03-20 RX ORDER — VITAMIN A ACETATE, BETA CAROTENE, ASCORBIC ACID, CHOLECALCIFEROL, .ALPHA.-TOCOPHEROL ACETATE, DL-, THIAMINE MONONITRATE, RIBOFLAVIN, NIACINAMIDE, PYRIDOXINE HYDROCHLORIDE, FOLIC ACID, CYANOCOBALAMIN, CALCIUM CARBONATE, FERROUS FUMARATE, ZINC OXIDE, CUPRIC OXIDE 3080; 12; 120; 400; 1; 1.84; 3; 20; 22; 920; 25; 200; 27; 10; 2 [IU]/1; UG/1; MG/1; [IU]/1; MG/1; MG/1; MG/1; MG/1; MG/1; [IU]/1; MG/1; MG/1; MG/1; MG/1; MG/1
TABLET, FILM COATED ORAL DAILY
COMMUNITY

## 2023-03-20 NOTE — PROGRESS NOTES
OB follow up     Sarah Clifford is a 22 y.o.  30w4d being seen today for her obstetrical visit.  Patient reports having issues with her vision- seeing red and green spots. Her blood pressure has been low these past 6 mths, and eye doctor told her it could be associated with her blood pressure.  . Fetal movement: normal.    Her prenatal care is complicated by (and status): Hx of PPH, anemia     Review of Systems  Cramping/contractions : none   Vaginal bleeding: none   Fetal movement good     /64   Wt 56.2 kg (124 lb)   LMP 2022 (Approximate)   BMI 21.97 kg/m²     FHT: 134 BPM   Uterine Size: 26 cm       Assessment    Diagnoses and all orders for this visit:    1. Encounter for supervision of other normal pregnancy in third trimester (Primary)    2. History of postpartum hemorrhage, currently pregnant in third trimester    3. Sickle cell trait in mother affecting pregnancy (HCC)  -     Urine Culture - Urine, Urine, Clean Catch    4. Fundal height low for dates in third trimester  -     US Ob Follow Up Transabdominal Approach    5. Anemia during pregnancy in third trimester        1) pregnancy at 30w4d   Rh+, Borderline 1 hour, to do 3 hour   Discussed vision change  2) Anemia in pregnancy, mild   Reasoning to evaluate and treat reviewed  On oral iron   3) Hx of PPH  Risk factor   4) FH low for dates  Check growth next week   5) Hx of sickle cell trait  Check urine culture       Plan    Reviewed this stage of pregnancy  Problem list updated   Follow up in 1 weeks    Rober Grayson MD   3/20/2023  12:19 EDT

## 2023-03-28 ENCOUNTER — ROUTINE PRENATAL (OUTPATIENT)
Dept: OBSTETRICS AND GYNECOLOGY | Facility: CLINIC | Age: 23
End: 2023-03-28
Payer: COMMERCIAL

## 2023-03-28 VITALS — BODY MASS INDEX: 22.32 KG/M2 | DIASTOLIC BLOOD PRESSURE: 66 MMHG | WEIGHT: 126 LBS | SYSTOLIC BLOOD PRESSURE: 104 MMHG

## 2023-03-28 DIAGNOSIS — O26.843 FUNDAL HEIGHT LOW FOR DATES IN THIRD TRIMESTER: ICD-10-CM

## 2023-03-28 DIAGNOSIS — D57.3 SICKLE CELL TRAIT IN MOTHER AFFECTING PREGNANCY: ICD-10-CM

## 2023-03-28 DIAGNOSIS — Z34.83 ENCOUNTER FOR SUPERVISION OF OTHER NORMAL PREGNANCY IN THIRD TRIMESTER: Primary | ICD-10-CM

## 2023-03-28 DIAGNOSIS — O99.019 SICKLE CELL TRAIT IN MOTHER AFFECTING PREGNANCY: ICD-10-CM

## 2023-03-28 DIAGNOSIS — O99.013 ANEMIA DURING PREGNANCY IN THIRD TRIMESTER: ICD-10-CM

## 2023-03-28 DIAGNOSIS — O09.293 HISTORY OF POSTPARTUM HEMORRHAGE, CURRENTLY PREGNANT IN THIRD TRIMESTER: ICD-10-CM

## 2023-03-28 LAB
GLUCOSE UR STRIP-MCNC: NEGATIVE MG/DL
PROT UR STRIP-MCNC: NEGATIVE MG/DL

## 2023-03-28 NOTE — PROGRESS NOTES
OB follow up     Sarah Clifford is a 22 y.o.  31w5d being seen today for her obstetrical visit.  Patient reports no complaints. Fetal movement: normal.    Her prenatal care is complicated by (and status): Hx of PPH, anemia     Review of Systems  Cramping/contractions : none   Vaginal bleeding: none   Fetal movement good     /66   Wt 57.2 kg (126 lb)   LMP 2022 (Approximate)   BMI 22.32 kg/m²     FHT: 144 BPM   Uterine Size: 28 cm       Assessment    Diagnoses and all orders for this visit:    1. Encounter for supervision of other normal pregnancy in third trimester (Primary)    2. History of postpartum hemorrhage, currently pregnant in third trimester    3. Sickle cell trait in mother affecting pregnancy (HCC)  -     Urine Culture - , Urine, Clean Catch    4. Fundal height low for dates in third trimester    5. Anemia during pregnancy in third trimester        1) pregnancy at 31w5d   2) Sickle cell trait  -Urine culture sent today   3) FH low   Growth AGA - 34%, A/C 23%, transverse and normal VERNON.   Expectations reviewed.   4) Anemia   Mild and stable on oral iron         Plan    Reviewed this stage of pregnancy  Problem list updated   Follow up in 2 weeks    Rober Grayson MD   3/28/2023  14:47 EDT

## 2023-03-31 LAB
BACTERIA UR CULT: NORMAL
BACTERIA UR CULT: NORMAL

## 2023-04-05 ENCOUNTER — TELEPHONE (OUTPATIENT)
Dept: OBSTETRICS AND GYNECOLOGY | Facility: CLINIC | Age: 23
End: 2023-04-05
Payer: COMMERCIAL

## 2023-04-05 NOTE — TELEPHONE ENCOUNTER
----- Message from Estefany Irizarry MA sent at 3/31/2023  2:51 PM EDT -----  N/a, voicemail is not set up/araceli  ----- Message -----  From: Rober Grayson MD  Sent: 3/31/2023   8:38 AM EDT  To: ANA Mead, urine culture did not show UTI. Please let her know. Thanks Dr. Grayson

## 2023-04-11 ENCOUNTER — ROUTINE PRENATAL (OUTPATIENT)
Dept: OBSTETRICS AND GYNECOLOGY | Facility: CLINIC | Age: 23
End: 2023-04-11
Payer: COMMERCIAL

## 2023-04-11 VITALS — BODY MASS INDEX: 22.67 KG/M2 | WEIGHT: 128 LBS | SYSTOLIC BLOOD PRESSURE: 98 MMHG | DIASTOLIC BLOOD PRESSURE: 59 MMHG

## 2023-04-11 DIAGNOSIS — D57.3 SICKLE CELL TRAIT IN MOTHER AFFECTING PREGNANCY: ICD-10-CM

## 2023-04-11 DIAGNOSIS — O09.293 HISTORY OF POSTPARTUM HEMORRHAGE, CURRENTLY PREGNANT IN THIRD TRIMESTER: ICD-10-CM

## 2023-04-11 DIAGNOSIS — Z34.83 ENCOUNTER FOR SUPERVISION OF OTHER NORMAL PREGNANCY IN THIRD TRIMESTER: Primary | ICD-10-CM

## 2023-04-11 DIAGNOSIS — O99.013 ANEMIA DURING PREGNANCY IN THIRD TRIMESTER: ICD-10-CM

## 2023-04-11 DIAGNOSIS — O26.843 FUNDAL HEIGHT LOW FOR DATES IN THIRD TRIMESTER: ICD-10-CM

## 2023-04-11 DIAGNOSIS — O99.019 SICKLE CELL TRAIT IN MOTHER AFFECTING PREGNANCY: ICD-10-CM

## 2023-04-11 LAB
GLUCOSE UR STRIP-MCNC: NEGATIVE MG/DL
PROT UR STRIP-MCNC: NEGATIVE MG/DL

## 2023-04-11 NOTE — PROGRESS NOTES
OB follow up     Sarah Clifford is a 22 y.o.  33w5d being seen today for her obstetrical visit.  Patient reports no complaints. Fetal movement: normal.    Her prenatal care is complicated by (and status): Hx of PPH and anemia     Review of Systems  Cramping/contractions : none   Vaginal bleeding: none   Fetal movement good     BP 98/59   Wt 58.1 kg (128 lb)   LMP 2022 (Approximate)   BMI 22.67 kg/m²     FHT: 154 BPM   Uterine Size: 28 cm       Assessment    Diagnoses and all orders for this visit:    1. Encounter for supervision of other normal pregnancy in third trimester (Primary)    2. History of postpartum hemorrhage, currently pregnant in third trimester    3. Sickle cell trait in mother affecting pregnancy    4. Fundal height low for dates in third trimester  -     US Ob Follow Up Transabdominal Approach; Future    5. Anemia during pregnancy in third trimester        1) pregnancy at 33w5d   2) FH low & Insufficient weight gain (5# overall, goal of 1# per week)   Last growth AGA, but , 50%ile, Recheck next visit.   3) Anemia   Mild and stable on oral iron   4) Hx of PPH  Risk factor for delivery   5) sickle cell trait   Urine culture negative        Plan    Reviewed this stage of pregnancy  Problem list updated   Follow up in 2 weeks    Rober Grayson MD   2023  13:34 EDT

## 2023-04-19 ENCOUNTER — PATIENT OUTREACH (OUTPATIENT)
Dept: LABOR AND DELIVERY | Facility: HOSPITAL | Age: 23
End: 2023-04-19
Payer: COMMERCIAL

## 2023-04-19 NOTE — OUTREACH NOTE
[facility]  Unable to Reach       Questions/Answers    Flowsheet Row Responses   Pending Outreach Prenatal Check-in   Call Attempt First   Outcome Missing or invalid number   Next Call Attempt Date 04/21/23        Numbers in chart are not in service, other number that I have for her does not have VM. Will try that number again on Friday and otherwise send My Chart message.     Mehreen Hernandez RN  Maternity Nurse Navigator    4/19/2023, 11:27 EDT

## 2023-04-21 ENCOUNTER — PATIENT OUTREACH (OUTPATIENT)
Dept: LABOR AND DELIVERY | Facility: HOSPITAL | Age: 23
End: 2023-04-21
Payer: COMMERCIAL

## 2023-04-21 NOTE — OUTREACH NOTE
[facility]  Unable to Reach       Questions/Answers    Flowsheet Row Responses   Pending Outreach Prenatal Check-in   Call Attempt Second   Outcome MyChart message sent to patient, Not available   Unable to reach comments: no Vm option        Sent 35 week info via My Chart, will plan to f/u inpatient after delivery.     Mehreen CRUZ - RN  Maternity Nurse Navigator    4/21/2023, 11:33 EDT

## 2023-04-25 ENCOUNTER — ROUTINE PRENATAL (OUTPATIENT)
Dept: OBSTETRICS AND GYNECOLOGY | Facility: CLINIC | Age: 23
End: 2023-04-25
Payer: COMMERCIAL

## 2023-04-25 VITALS — DIASTOLIC BLOOD PRESSURE: 66 MMHG | WEIGHT: 132 LBS | SYSTOLIC BLOOD PRESSURE: 107 MMHG | BODY MASS INDEX: 23.38 KG/M2

## 2023-04-25 DIAGNOSIS — Z34.83 ENCOUNTER FOR SUPERVISION OF OTHER NORMAL PREGNANCY IN THIRD TRIMESTER: Primary | ICD-10-CM

## 2023-04-25 DIAGNOSIS — O99.013 ANEMIA DURING PREGNANCY IN THIRD TRIMESTER: ICD-10-CM

## 2023-04-25 DIAGNOSIS — O09.293 HISTORY OF POSTPARTUM HEMORRHAGE, CURRENTLY PREGNANT IN THIRD TRIMESTER: ICD-10-CM

## 2023-04-25 DIAGNOSIS — O99.019 SICKLE CELL TRAIT IN MOTHER AFFECTING PREGNANCY: ICD-10-CM

## 2023-04-25 DIAGNOSIS — D57.3 SICKLE CELL TRAIT IN MOTHER AFFECTING PREGNANCY: ICD-10-CM

## 2023-04-25 DIAGNOSIS — O26.843 FUNDAL HEIGHT LOW FOR DATES IN THIRD TRIMESTER: ICD-10-CM

## 2023-04-25 LAB
GLUCOSE UR STRIP-MCNC: NEGATIVE MG/DL
PROT UR STRIP-MCNC: NEGATIVE MG/DL

## 2023-04-25 NOTE — PROGRESS NOTES
Ob follow up    Sarah Clifford is a 22 y.o.  35w5d patient being seen today for her obstetrical visit. Patient reports chest pains yesterday while at work. . Fetal movement: normal.    Her prenatal care is complicated by (and status) : Hx of PPH and anemia       ROS -   Fetal Movement good   Vaginal bleeding none   Cramping/Contractions none      /66   Wt 59.9 kg (132 lb)   LMP 2022 (Approximate)   BMI 23.38 kg/m²     FHT:  146 BPM    Uterine Size: 30 cm   Presentations: cephalic   Pelvic Exam:     Dilation: deferred    Effacement: deferred    Station:  deferred                 Assessment    Diagnoses and all orders for this visit:    1. Encounter for supervision of other normal pregnancy in third trimester (Primary)    2. Fundal height low for dates in third trimester    3. History of postpartum hemorrhage, currently pregnant in third trimester    4. Sickle cell trait in mother affecting pregnancy    5. Anemia during pregnancy in third trimester        1) Pregnancy at 35w5d  2) Fetal status reassuring   3) GBS status - to do @ 36 weeks   4) FH low   Growth AGA 16%, A/C 18%, cephalic and normal VERNON   So good enough right now.   5) Anemia   Mild and stable on oral iron   6) Hx of PPH  7) Sickle trait.       Plan    Labor warnings   [facility] [place]        Rober Grayson MD   2023  11:01 EDT

## 2023-05-02 ENCOUNTER — ROUTINE PRENATAL (OUTPATIENT)
Dept: OBSTETRICS AND GYNECOLOGY | Facility: CLINIC | Age: 23
End: 2023-05-02
Payer: COMMERCIAL

## 2023-05-02 VITALS — WEIGHT: 135 LBS | SYSTOLIC BLOOD PRESSURE: 105 MMHG | DIASTOLIC BLOOD PRESSURE: 67 MMHG | BODY MASS INDEX: 23.91 KG/M2

## 2023-05-02 DIAGNOSIS — O99.019 SICKLE CELL TRAIT IN MOTHER AFFECTING PREGNANCY: ICD-10-CM

## 2023-05-02 DIAGNOSIS — O99.013 ANEMIA DURING PREGNANCY IN THIRD TRIMESTER: ICD-10-CM

## 2023-05-02 DIAGNOSIS — O09.293 HISTORY OF POSTPARTUM HEMORRHAGE, CURRENTLY PREGNANT IN THIRD TRIMESTER: ICD-10-CM

## 2023-05-02 DIAGNOSIS — Z34.83 ENCOUNTER FOR SUPERVISION OF OTHER NORMAL PREGNANCY IN THIRD TRIMESTER: Primary | ICD-10-CM

## 2023-05-02 DIAGNOSIS — D57.3 SICKLE CELL TRAIT IN MOTHER AFFECTING PREGNANCY: ICD-10-CM

## 2023-05-02 DIAGNOSIS — Z36.9 ANTENATAL SCREENING ENCOUNTER: ICD-10-CM

## 2023-05-02 LAB
GLUCOSE UR STRIP-MCNC: NEGATIVE MG/DL
PROT UR STRIP-MCNC: NEGATIVE MG/DL

## 2023-05-02 NOTE — PROGRESS NOTES
OB follow up     Sarah Clifford is a 22 y.o.  36w5d being seen today for her obstetrical visit.  Patient reports pressure with urinating and BM. . Fetal movement: normal.    Her prenatal care is complicated by (and status): Hx of PPH and anemia     Review of Systems  Cramping/contractions : None   Vaginal bleeding: none   Fetal movement good     /67   Wt 61.2 kg (135 lb)   LMP 2022 (Approximate)   BMI 23.91 kg/m²     FHT: 156 BPM   Uterine Size: 32 cm   Cx 50/Cl/-2     Assessment    Diagnoses and all orders for this visit:    1. Encounter for supervision of other normal pregnancy in third trimester (Primary)    2. History of postpartum hemorrhage, currently pregnant in third trimester    3. Sickle cell trait in mother affecting pregnancy    4. Anemia during pregnancy in third trimester    5.  screening encounter  -     Strep B Screen - , Vaginal/Rectum        1) pregnancy at 36w5d   2) GBS today   3) [facility] BID   4) Labor warnings   5) Hx of PPH, watch with delivery   6) Anemia, mild   Stable on oral iron     Plan    Reviewed this stage of pregnancy  Problem list updated   Follow up in 1 weeks    Rober Grayson MD   2023  13:11 EDT

## 2023-05-04 LAB — GP B STREP DNA SPEC QL NAA+PROBE: NEGATIVE

## 2023-05-08 ENCOUNTER — TELEPHONE (OUTPATIENT)
Dept: OBSTETRICS AND GYNECOLOGY | Facility: CLINIC | Age: 23
End: 2023-05-08
Payer: COMMERCIAL

## 2023-05-08 NOTE — TELEPHONE ENCOUNTER
Pt calling concerned. Believes she might have lost mucus plug, but unsure. States she has since been experiencing sharp pain from vagina to lower stomach that comes every 20 minutes. After speaking with Emely, pt advised to monitor pain for now and head to hospital if at least 5-7 mins apart for an hour. Pt does have appt tomorrow    Cheryl

## 2023-05-09 ENCOUNTER — ROUTINE PRENATAL (OUTPATIENT)
Dept: OBSTETRICS AND GYNECOLOGY | Facility: CLINIC | Age: 23
End: 2023-05-09
Payer: COMMERCIAL

## 2023-05-09 VITALS — WEIGHT: 136 LBS | SYSTOLIC BLOOD PRESSURE: 111 MMHG | DIASTOLIC BLOOD PRESSURE: 67 MMHG | BODY MASS INDEX: 24.09 KG/M2

## 2023-05-09 DIAGNOSIS — O99.013 ANEMIA DURING PREGNANCY IN THIRD TRIMESTER: ICD-10-CM

## 2023-05-09 DIAGNOSIS — D57.3 SICKLE CELL TRAIT IN MOTHER AFFECTING PREGNANCY: ICD-10-CM

## 2023-05-09 DIAGNOSIS — O99.019 SICKLE CELL TRAIT IN MOTHER AFFECTING PREGNANCY: ICD-10-CM

## 2023-05-09 DIAGNOSIS — O09.293 HISTORY OF POSTPARTUM HEMORRHAGE, CURRENTLY PREGNANT IN THIRD TRIMESTER: ICD-10-CM

## 2023-05-09 DIAGNOSIS — Z34.83 ENCOUNTER FOR SUPERVISION OF OTHER NORMAL PREGNANCY IN THIRD TRIMESTER: Primary | ICD-10-CM

## 2023-05-09 LAB
GLUCOSE UR STRIP-MCNC: NEGATIVE MG/DL
PROT UR STRIP-MCNC: NEGATIVE MG/DL

## 2023-05-09 NOTE — PROGRESS NOTES
Ob follow up    Sarah Clifford is a 22 y.o.  37w5d patient being seen today for her obstetrical visit. Patient reports no complaints. Fetal movement: normal.    Her prenatal care is complicated by (and status) : Hx of PPH, anemia       ROS -   Fetal Movement good   Vaginal bleeding none   Cramping/Contractions none      /67   Wt 61.7 kg (136 lb)   LMP 2022 (Approximate)   BMI 24.09 kg/m²     FHT:  144 BPM    Uterine Size: 33 cm   Presentations: cephalic   Pelvic Exam:     Dilation: 1cm    Effacement: 50%    Station:  -2                 Assessment    Diagnoses and all orders for this visit:    1. Encounter for supervision of other normal pregnancy in third trimester (Primary)    2. History of postpartum hemorrhage, currently pregnant in third trimester    3. Sickle cell trait in mother affecting pregnancy    4. Anemia during pregnancy in third trimester        1) Pregnancy at 37w5d  2) Fetal status reassuring   3) GBS status - negative   4) Hx of PPH, work to minimize anemia   5) Anemia, mild  Stable on oral iron     Plan    Labor warnings   [facility]        Rober Grayson MD   2023  11:16 EDT

## 2023-05-12 ENCOUNTER — HOSPITAL ENCOUNTER (EMERGENCY)
Facility: HOSPITAL | Age: 23
Discharge: HOME | End: 2023-05-12
Attending: OBSTETRICS & GYNECOLOGY | Admitting: OBSTETRICS & GYNECOLOGY
Payer: COMMERCIAL

## 2023-05-12 VITALS
BODY MASS INDEX: 24.09 KG/M2 | HEIGHT: 63 IN | TEMPERATURE: 98 F | SYSTOLIC BLOOD PRESSURE: 109 MMHG | HEART RATE: 101 BPM | DIASTOLIC BLOOD PRESSURE: 67 MMHG | RESPIRATION RATE: 18 BRPM

## 2023-05-12 LAB
BACTERIA UR QL AUTO: NORMAL /HPF
BILIRUB UR QL STRIP: NEGATIVE
CLARITY UR: CLEAR
COLOR UR: YELLOW
GLUCOSE UR STRIP-MCNC: NEGATIVE MG/DL
HGB UR QL STRIP.AUTO: NEGATIVE
HYALINE CASTS UR QL AUTO: NORMAL /LPF
KETONES UR QL STRIP: NEGATIVE
LEUKOCYTE ESTERASE UR QL STRIP.AUTO: ABNORMAL
NITRITE UR QL STRIP: NEGATIVE
PH UR STRIP.AUTO: 7.5 [PH] (ref 5–8)
PROT UR QL STRIP: NEGATIVE
RBC # UR STRIP: NORMAL /HPF
REF LAB TEST METHOD: NORMAL
SP GR UR STRIP: 1.01 (ref 1–1.03)
SQUAMOUS #/AREA URNS HPF: NORMAL /HPF
UROBILINOGEN UR QL STRIP: ABNORMAL
WBC # UR STRIP: NORMAL /HPF

## 2023-05-12 PROCEDURE — 81001 URINALYSIS AUTO W/SCOPE: CPT | Performed by: OBSTETRICS & GYNECOLOGY

## 2023-05-12 PROCEDURE — 59025 FETAL NON-STRESS TEST: CPT

## 2023-05-12 PROCEDURE — 99284 EMERGENCY DEPT VISIT MOD MDM: CPT | Performed by: OBSTETRICS & GYNECOLOGY

## 2023-05-12 NOTE — PLAN OF CARE
Problem: Adult Inpatient Plan of Care  Goal: Plan of Care Review  Outcome: Met  Flowsheets (Taken 5/12/2023 1337)  Progress: no change  Plan of Care Reviewed With:   patient   significant other  Outcome Evaluation: R/O labor  Goal: Patient-Specific Goal (Individualized)  Outcome: Met  Goal: Absence of Hospital-Acquired Illness or Injury  Outcome: Met  Intervention: Identify and Manage Fall Risk  Recent Flowsheet Documentation  Taken 5/12/2023 1000 by Erum Mota, RN  Safety Promotion/Fall Prevention: safety round/check completed  Goal: Optimal Comfort and Wellbeing  Outcome: Met  Goal: Readiness for Transition of Care  Outcome: Met   Goal Outcome Evaluation:  Plan of Care Reviewed With: patient, significant other        Progress: no change  Outcome Evaluation: R/O labor

## 2023-05-12 NOTE — OBED NOTES
"[facility]  Sarah Clifford  : 2000  MRN: [id number]  CSN: [id number]    OB ED Provider Note    Subjective   Chief Complaint   Patient presents with   • Abdominal Pain     Since 2 am. Pt reports similar to labor pains with first pregnancy. +FM, denies VB,LOF. 7/10.     Sarah Clifford is a 22 y.o. year old  with an Estimated Date of Delivery: 23 currently at 38w1d presenting with lower abdominal cramping since 0200 this morning. The cramping is constant and the patient rates the pain 7/10.  She denies leaking or bleeding and does feel fetal movement.  The patient states that her last labor four years ago began with constant pain like this, and when she presented to the hospital that time she was dilated 4 cm.    Prenatal care has been with Dr. Grayson.  It has been complicated by sickle cell traing, anemia and history of post partum hemorrhage..    OB History    Para Term  AB Living   3 1 1 0 1 1   SAB IAB Ectopic Molar Multiple Live Births   1 0 0 0 0 1      # Outcome Date GA Lbr Pedro/2nd Weight Sex Delivery Anes PTL Lv   3 Current            2 SAB 22 7w0d    SAB      1 Term 21 40w1d  2835 g (6 lb 4 oz) M Vag-Spont EPI N ARY      Complications: Postpartum Hemorrhage     Past Medical History:   Diagnosis Date   • PPH (postpartum hemorrhage) 2019    unsure if needed blood transfusion   • Dizziness      Past Surgical History:   Procedure Laterality Date   • WISDOM TOOTH EXTRACTION       No current facility-administered medications for this encounter.    No Known Allergies  Social History    Tobacco Use      Smoking status: Former        Packs/day: 1.00        Types: Cigarettes        Quit date:         Years since quittin.3        Passive exposure: Current      Smokeless tobacco: Never      Tobacco comments: Wants to quit    Review of Systems      Objective   Temp 98 °F (36.7 °C) (Oral)   Resp 18   Ht 160 cm (63\")   LMP 2022 (Approximate)   BMI 24.09 " kg/m²   General: well developed; well nourished  no acute distress   Abdomen: soft, non-tender; no masses  gravid    FHT's: reassuring and category 1      Cervix: was checked (by RN): 1 cm / 50 % / -2   Presentation: cephalic   Contractions: none   Chest: Unlabored respirations    CV:  not examined   Ext:   No C/C/E         Prenatal Labs  Lab Results   Component Value Date    HGB 10.8 (L) 03/01/2023    RUBELLAABIGG 1.84 09/19/2022    HEPBSAG Negative 09/19/2022    ABORH O Positive 01/03/2019    ABSCRN Negative 09/19/2022    BHR3PMR5 Non Reactive 09/19/2022    HEPCVIRUSABY <0.1 09/19/2022     03/01/2023    STREPGPB Negative 05/02/2023    URINECX Final report 03/29/2023    CHLAMNAA Negative 09/19/2022    NGONORRHON Negative 09/19/2022       Current Labs Reviewed   UA:    Lab Results   Component Value Date    SQUAMEPIUA 0-2 05/12/2023    SPECGRAVUR 1.011 05/12/2023    KETONESU Negative 05/12/2023    BLOODU Negative 05/12/2023    LEUKOCYTESUR Trace (A) 05/12/2023    NITRITEU Negative 05/12/2023    RBCUA 0-2 05/12/2023    WBCUA 0-2 05/12/2023    BACTERIA None Seen 05/12/2023          Assessment   1. IUP at 38w1d  2. Cramping     Plan   1. Cervix unchanged after three hours.  Patient will be discharged home.  2. Keep scheduled prenatal appointment    Renuka Gupta MD  5/12/2023  10:04 EDT

## 2023-05-16 ENCOUNTER — ROUTINE PRENATAL (OUTPATIENT)
Dept: OBSTETRICS AND GYNECOLOGY | Facility: CLINIC | Age: 23
End: 2023-05-16
Payer: COMMERCIAL

## 2023-05-16 VITALS — SYSTOLIC BLOOD PRESSURE: 103 MMHG | WEIGHT: 137 LBS | DIASTOLIC BLOOD PRESSURE: 67 MMHG | BODY MASS INDEX: 24.27 KG/M2

## 2023-05-16 DIAGNOSIS — D57.3 SICKLE CELL TRAIT IN MOTHER AFFECTING PREGNANCY: ICD-10-CM

## 2023-05-16 DIAGNOSIS — O09.293 HISTORY OF POSTPARTUM HEMORRHAGE, CURRENTLY PREGNANT IN THIRD TRIMESTER: ICD-10-CM

## 2023-05-16 DIAGNOSIS — O99.019 SICKLE CELL TRAIT IN MOTHER AFFECTING PREGNANCY: ICD-10-CM

## 2023-05-16 DIAGNOSIS — O99.013 ANEMIA DURING PREGNANCY IN THIRD TRIMESTER: ICD-10-CM

## 2023-05-16 DIAGNOSIS — Z34.83 ENCOUNTER FOR SUPERVISION OF OTHER NORMAL PREGNANCY IN THIRD TRIMESTER: Primary | ICD-10-CM

## 2023-05-16 LAB
GLUCOSE UR STRIP-MCNC: NEGATIVE MG/DL
PROT UR STRIP-MCNC: NEGATIVE MG/DL

## 2023-05-16 RX ORDER — SODIUM CHLORIDE 0.9 % (FLUSH) 0.9 %
10 SYRINGE (ML) INJECTION EVERY 12 HOURS SCHEDULED
Status: CANCELLED | OUTPATIENT
Start: 2023-05-16

## 2023-05-16 RX ORDER — MAGNESIUM CARB/ALUMINUM HYDROX 105-160MG
30 TABLET,CHEWABLE ORAL ONCE AS NEEDED
Status: CANCELLED | OUTPATIENT
Start: 2023-05-16

## 2023-05-16 RX ORDER — SODIUM CHLORIDE, SODIUM LACTATE, POTASSIUM CHLORIDE, CALCIUM CHLORIDE 600; 310; 30; 20 MG/100ML; MG/100ML; MG/100ML; MG/100ML
125 INJECTION, SOLUTION INTRAVENOUS CONTINUOUS
Status: CANCELLED | OUTPATIENT
Start: 2023-05-16

## 2023-05-16 RX ORDER — FAMOTIDINE 10 MG
20 TABLET ORAL 2 TIMES DAILY PRN
Status: CANCELLED | OUTPATIENT
Start: 2023-05-16

## 2023-05-16 RX ORDER — SODIUM CHLORIDE 0.9 % (FLUSH) 0.9 %
10 SYRINGE (ML) INJECTION AS NEEDED
Status: CANCELLED | OUTPATIENT
Start: 2023-05-16

## 2023-05-16 RX ORDER — ACETAMINOPHEN 325 MG/1
650 TABLET ORAL EVERY 4 HOURS PRN
Status: CANCELLED | OUTPATIENT
Start: 2023-05-16

## 2023-05-16 RX ORDER — OXYTOCIN 10 [USP'U]/ML
250 INJECTION, SOLUTION INTRAMUSCULAR; INTRAVENOUS CONTINUOUS
Status: CANCELLED | OUTPATIENT
Start: 2023-05-16 | End: 2023-05-16

## 2023-05-16 RX ORDER — MISOPROSTOL 100 UG/1
25 TABLET ORAL
Status: CANCELLED | OUTPATIENT
Start: 2023-05-16 | End: 2023-05-16

## 2023-05-16 RX ORDER — ONDANSETRON 2 MG/ML
4 INJECTION INTRAMUSCULAR; INTRAVENOUS EVERY 6 HOURS PRN
Status: CANCELLED | OUTPATIENT
Start: 2023-05-16

## 2023-05-16 RX ORDER — FAMOTIDINE 10 MG/ML
20 INJECTION, SOLUTION INTRAVENOUS 2 TIMES DAILY PRN
Status: CANCELLED | OUTPATIENT
Start: 2023-05-16

## 2023-05-16 RX ORDER — ONDANSETRON 4 MG/1
4 TABLET, FILM COATED ORAL EVERY 6 HOURS PRN
Status: CANCELLED | OUTPATIENT
Start: 2023-05-16

## 2023-05-16 RX ORDER — OXYTOCIN 10 [USP'U]/ML
999 INJECTION, SOLUTION INTRAMUSCULAR; INTRAVENOUS ONCE
Status: CANCELLED | OUTPATIENT
Start: 2023-05-16

## 2023-05-16 RX ORDER — TERBUTALINE SULFATE 1 MG/ML
0.25 INJECTION, SOLUTION SUBCUTANEOUS AS NEEDED
Status: CANCELLED | OUTPATIENT
Start: 2023-05-16

## 2023-05-16 RX ORDER — MISOPROSTOL 100 UG/1
800 TABLET ORAL ONCE AS NEEDED
Status: CANCELLED | OUTPATIENT
Start: 2023-05-16

## 2023-05-16 RX ORDER — LIDOCAINE HYDROCHLORIDE 10 MG/ML
5 INJECTION, SOLUTION EPIDURAL; INFILTRATION; INTRACAUDAL; PERINEURAL AS NEEDED
Status: CANCELLED | OUTPATIENT
Start: 2023-05-16

## 2023-05-16 RX ORDER — CARBOPROST TROMETHAMINE 250 UG/ML
250 INJECTION, SOLUTION INTRAMUSCULAR
Status: CANCELLED | OUTPATIENT
Start: 2023-05-16

## 2023-05-16 RX ORDER — METHYLERGONOVINE MALEATE 0.2 MG/ML
200 INJECTION INTRAVENOUS ONCE AS NEEDED
Status: CANCELLED | OUTPATIENT
Start: 2023-05-16

## 2023-05-16 NOTE — Clinical Note
Milli, She is for induction on Thursday. (To call at 10 PM Wednesday and come 12:01 AM Thursday). Please arrange with L&D. Thanks, Dr. Grayson

## 2023-05-16 NOTE — LETTER
23    SCHEDULING FORM  C-SECTIONS/INDUCTIONS    Patient:  Sarah Clifford  :  2000    Phone: [phone] (home)     OB provider:  Rober Grayson MD    Summary:  22 y.o. female     Type of Delivery:  Induction   Priority:  Elective    Desired Date: 23      Time: 0001    Dating   Estimated Date of Delivery: 23    Gestational age at Desired date of Induction or CS: 39 weeks 0 days  ----------------------------------------------------------------------------  By ACOG Guidelines, women should be 39 weeks or greater before initiating an elective induction (non-medically indicated) delivery     Maternal Indications:        Fetal/Placental Conditions:      ----------------------------------------------------------------------------    For patients less than 39 weeks with indications not included in the above list, [facility] consult is required prior to scheduling.    [facility] Approved indication:       Date of consult:      I attest that the above entries are accurate to the best of my knowledge:    Rober Grayson MD  2023  11:26 EDT

## 2023-05-16 NOTE — PROGRESS NOTES
Ob follow up    Sarah Clifford is a 22 y.o.  38w5d patient being seen today for her obstetrical visit. Patient reports fall yesterday, hit eye and hot stomach on edge of desk. . Fetal movement: normal.    Her prenatal care is complicated by (and status) : Hx of PPH, anemia       ROS -   Fetal Movement good   Vaginal bleeding none   Cramping/Contractions intermittent      /67   Wt 62.1 kg (137 lb)   LMP 2022 (Approximate)   BMI 24.27 kg/m²     FHT:  134BPM    Uterine Size: 34 cm   Presentations: cephalic   Pelvic Exam:     Dilation: 1cm    Effacement: 50%    Station:  -2                 Assessment    Diagnoses and all orders for this visit:    1. Encounter for supervision of other normal pregnancy in third trimester (Primary)  -     Labor Induction  -     lidocaine PF 1% (XYLOCAINE) injection 5 mL  -     sodium chloride 0.9 % flush 10 mL  -     sodium chloride 0.9 % flush 10 mL  -     lactated ringers bolus 1,000 mL  -     lactated ringers infusion  -     acetaminophen (TYLENOL) tablet 650 mg  -     ondansetron (ZOFRAN) tablet 4 mg  -     ondansetron (ZOFRAN) injection 4 mg  -     terbutaline (BRETHINE) injection 0.25 mg  -     mineral oil liquid 30 mL  -     famotidine (PEPCID) injection 20 mg  -     famotidine (PEPCID) tablet 20 mg  -     oxytocin (PITOCIN) injection  -     oxytocin (PITOCIN) injection  -     methylergonovine (METHERGINE) injection 200 mcg  -     carboprost (HEMABATE) injection 250 mcg  -     miSOPROStol (CYTOTEC) tablet 800 mcg  -     Tranexamic Acid 1,000 mg in sodium chloride 0.9 % 100 mL  -     miSOPROStol (CYTOTEC) tablet 25 mcg    2. History of postpartum hemorrhage, currently pregnant in third trimester    3. Sickle cell trait in mother affecting pregnancy    4. Anemia during pregnancy in third trimester    Other orders  -     Admit To Obstetrics Inpatient; Standing  -     Code Status and Medical Interventions:; Standing  -     Obtain Informed Consent; Standing  -      Vital Signs Per Hospital Policy; Standing  -     Initiate Group Beta Strep (GBS) Prophylaxis Protocol, If Criteria Met; Standing  -     Position Change - For Intra-Uterine Resusitation for Hypertonus, HyperStimulation or Non-Reassuring Fetal Status; Standing  -     Mini-Prep Prior to Delivery; Standing  -     Continuous Fetal Monitoring With NST on Admission and Prior to Initiation of Oxytocin.; Standing  -     External Uterine Contraction Monitoring; Standing  -     Notify Provider (Specified); Standing  -     Notify Provider of Tachysystole (Per Hospital Algorithm); Standing  -     Notify Provider if Membranes Ruptured, Bleeding Greater Than 1 Pad Per Hour, Fetal Heart Tone Abnormality or Severe Pain; Standing  -     NPO Diet NPO Type: Ice Chips; Standing  -     Inpatient Anesthesiology Consult; Standing  -     CBC & Differential; Standing  -     Comprehensive Metabolic Panel; Standing  -     Type & Screen; Standing  -     Insert Peripheral IV; Standing  -     Saline Lock & Maintain IV Access; Standing  -     Vital Signs Per Hospital Policy; Standing  -     Fundal & Lochia Check; Standing  -     Fundal & Lochia Check; Standing  -     Notify Provider (Specified); Standing  -     Diet: Regular/House Diet; Texture: Regular Texture (IDDSI 7); Fluid Consistency: Thin (IDDSI 0); Standing  -     Advance Diet As Tolerated -; Standing  -     VTE Prophylaxis Not Indicated: No Risk Factors (0); </= 3 (Low Risk); Standing        1) Pregnancy at 38w5d  2) Fetal status reassuring   3) GBS status - negative   4) Hx of PPH  Work to minimize anemia   5) Anemia, mild   Stable on oral iron     Options reviewed   Ready to consider induction at 39 weeks.   Ordered and arranged.     Plan    Labor warnings   [facility]        Rober Grayson MD   5/16/2023  11:58 EDT

## 2023-05-18 ENCOUNTER — HOSPITAL ENCOUNTER (INPATIENT)
Dept: LABOR AND DELIVERY | Facility: HOSPITAL | Age: 23
Discharge: HOME | End: 2023-05-18
Payer: COMMERCIAL

## 2023-05-18 ENCOUNTER — ANESTHESIA (OUTPATIENT)
Dept: LABOR AND DELIVERY | Facility: HOSPITAL | Age: 23
End: 2023-05-18
Payer: COMMERCIAL

## 2023-05-18 ENCOUNTER — HOSPITAL ENCOUNTER (INPATIENT)
Facility: HOSPITAL | Age: 23
LOS: 2 days | Discharge: HOME | End: 2023-05-20
Attending: OBSTETRICS & GYNECOLOGY | Admitting: OBSTETRICS & GYNECOLOGY
Payer: COMMERCIAL

## 2023-05-18 ENCOUNTER — ANESTHESIA EVENT (OUTPATIENT)
Dept: LABOR AND DELIVERY | Facility: HOSPITAL | Age: 23
End: 2023-05-18
Payer: COMMERCIAL

## 2023-05-18 DIAGNOSIS — Z34.83 ENCOUNTER FOR SUPERVISION OF OTHER NORMAL PREGNANCY IN THIRD TRIMESTER: ICD-10-CM

## 2023-05-18 PROBLEM — O99.019 MATERNAL ANEMIA IN PREGNANCY, ANTEPARTUM: Status: ACTIVE | Noted: 2023-05-18

## 2023-05-18 PROBLEM — D57.3 SICKLE CELL TRAIT IN MOTHER AFFECTING PREGNANCY: Status: ACTIVE | Noted: 2023-05-18

## 2023-05-18 PROBLEM — O99.019 SICKLE CELL TRAIT IN MOTHER AFFECTING PREGNANCY: Status: ACTIVE | Noted: 2023-05-18

## 2023-05-18 PROBLEM — O09.293 HISTORY OF POSTPARTUM HEMORRHAGE, CURRENTLY PREGNANT IN THIRD TRIMESTER: Status: ACTIVE | Noted: 2023-05-18

## 2023-05-18 LAB
ABO GROUP BLD: NORMAL
ALBUMIN SERPL-MCNC: 3.7 G/DL (ref 3.5–5.2)
ALBUMIN/GLOB SERPL: 1.2 G/DL
ALP SERPL-CCNC: 171 U/L (ref 39–117)
ALT SERPL W P-5'-P-CCNC: 8 U/L (ref 1–33)
ANION GAP SERPL CALCULATED.3IONS-SCNC: 11 MMOL/L (ref 5–15)
ANISOCYTOSIS BLD QL: ABNORMAL
AST SERPL-CCNC: 15 U/L (ref 1–32)
BILIRUB SERPL-MCNC: <0.2 MG/DL (ref 0–1.2)
BLD GP AB SCN SERPL QL: NEGATIVE
BUN SERPL-MCNC: 4 MG/DL (ref 6–20)
BUN/CREAT SERPL: 7 (ref 7–25)
BURR CELLS BLD QL SMEAR: ABNORMAL
CALCIUM SPEC-SCNC: 9.4 MG/DL (ref 8.6–10.5)
CHLORIDE SERPL-SCNC: 104 MMOL/L (ref 98–107)
CO2 SERPL-SCNC: 22 MMOL/L (ref 22–29)
CREAT SERPL-MCNC: 0.57 MG/DL (ref 0.57–1)
DEPRECATED RDW RBC AUTO: 39.5 FL (ref 37–54)
EGFRCR SERPLBLD CKD-EPI 2021: 133.6 ML/MIN/1.73
EOSINOPHIL # BLD MANUAL: 0.08 10*3/MM3 (ref 0–0.4)
EOSINOPHIL NFR BLD MANUAL: 1.1 % (ref 0.3–6.2)
ERYTHROCYTE [DISTWIDTH] IN BLOOD BY AUTOMATED COUNT: 16.2 % (ref 12.3–15.4)
GLOBULIN UR ELPH-MCNC: 3 GM/DL
GLUCOSE SERPL-MCNC: 83 MG/DL (ref 65–99)
HCT VFR BLD AUTO: 32.3 % (ref 34–46.6)
HGB BLD-MCNC: 10.8 G/DL (ref 12–15.9)
LYMPHOCYTES # BLD MANUAL: 1.34 10*3/MM3 (ref 0.7–3.1)
LYMPHOCYTES NFR BLD MANUAL: 4.4 % (ref 5–12)
MCH RBC QN AUTO: 23.2 PG (ref 26.6–33)
MCHC RBC AUTO-ENTMCNC: 33.4 G/DL (ref 31.5–35.7)
MCV RBC AUTO: 69.3 FL (ref 79–97)
MICROCYTES BLD QL: ABNORMAL
MONOCYTES # BLD: 0.31 10*3/MM3 (ref 0.1–0.9)
NEUTROPHILS # BLD AUTO: 5.38 10*3/MM3 (ref 1.7–7)
NEUTROPHILS NFR BLD MANUAL: 75.6 % (ref 42.7–76)
PLAT MORPH BLD: NORMAL
PLATELET # BLD AUTO: 292 10*3/MM3 (ref 140–450)
PMV BLD AUTO: 9.6 FL (ref 6–12)
POIKILOCYTOSIS BLD QL SMEAR: ABNORMAL
POTASSIUM SERPL-SCNC: 3.5 MMOL/L (ref 3.5–5.2)
PROT SERPL-MCNC: 6.7 G/DL (ref 6–8.5)
RBC # BLD AUTO: 4.66 10*6/MM3 (ref 3.77–5.28)
RH BLD: POSITIVE
SCHISTOCYTES BLD QL SMEAR: ABNORMAL
SMUDGE CELLS BLD QL SMEAR: ABNORMAL
SODIUM SERPL-SCNC: 137 MMOL/L (ref 136–145)
T&S EXPIRATION DATE: NORMAL
VARIANT LYMPHS NFR BLD MANUAL: 18.9 % (ref 19.6–45.3)
WBC NRBC COR # BLD: 7.11 10*3/MM3 (ref 3.4–10.8)

## 2023-05-18 PROCEDURE — 10907ZC DRAINAGE OF AMNIOTIC FLUID, THERAPEUTIC FROM PRODUCTS OF CONCEPTION, VIA NATURAL OR ARTIFICIAL OPENING: ICD-10-PCS | Performed by: OBSTETRICS & GYNECOLOGY

## 2023-05-18 PROCEDURE — 86901 BLOOD TYPING SEROLOGIC RH(D): CPT | Performed by: OBSTETRICS & GYNECOLOGY

## 2023-05-18 PROCEDURE — 25010000002 ROPIVACAINE PER 1 MG: Performed by: ANESTHESIOLOGY

## 2023-05-18 PROCEDURE — 85025 COMPLETE CBC W/AUTO DIFF WBC: CPT | Performed by: OBSTETRICS & GYNECOLOGY

## 2023-05-18 PROCEDURE — 0UQMXZZ REPAIR VULVA, EXTERNAL APPROACH: ICD-10-PCS | Performed by: OBSTETRICS & GYNECOLOGY

## 2023-05-18 PROCEDURE — 86900 BLOOD TYPING SEROLOGIC ABO: CPT | Performed by: OBSTETRICS & GYNECOLOGY

## 2023-05-18 PROCEDURE — 59410 OBSTETRICAL CARE: CPT | Performed by: OBSTETRICS & GYNECOLOGY

## 2023-05-18 PROCEDURE — 25010000002 ONDANSETRON PER 1 MG: Performed by: OBSTETRICS & GYNECOLOGY

## 2023-05-18 PROCEDURE — 86850 RBC ANTIBODY SCREEN: CPT | Performed by: OBSTETRICS & GYNECOLOGY

## 2023-05-18 PROCEDURE — 85007 BL SMEAR W/DIFF WBC COUNT: CPT | Performed by: OBSTETRICS & GYNECOLOGY

## 2023-05-18 PROCEDURE — C1755 CATHETER, INTRASPINAL: HCPCS | Performed by: ANESTHESIOLOGY

## 2023-05-18 PROCEDURE — 80053 COMPREHEN METABOLIC PANEL: CPT | Performed by: OBSTETRICS & GYNECOLOGY

## 2023-05-18 PROCEDURE — 25010000002 ONDANSETRON PER 1 MG: Performed by: ANESTHESIOLOGY

## 2023-05-18 RX ORDER — ONDANSETRON 4 MG/1
4 TABLET, FILM COATED ORAL EVERY 6 HOURS PRN
Status: DISCONTINUED | OUTPATIENT
Start: 2023-05-18 | End: 2023-05-18 | Stop reason: HOSPADM

## 2023-05-18 RX ORDER — FERROUS SULFATE 325(65) MG
325 TABLET ORAL
Status: DISCONTINUED | OUTPATIENT
Start: 2023-05-19 | End: 2023-05-20 | Stop reason: HOSPADM

## 2023-05-18 RX ORDER — MISOPROSTOL 200 UG/1
800 TABLET ORAL AS NEEDED
Status: DISCONTINUED | OUTPATIENT
Start: 2023-05-18 | End: 2023-05-20 | Stop reason: HOSPADM

## 2023-05-18 RX ORDER — FENTANYL CIT 0.2 MG/100ML-ROPIV 0.2%-NACL 0.9% EPIDURAL INJ 2/0.2 MCG/ML-%
10 SOLUTION INJECTION CONTINUOUS
Status: DISCONTINUED | OUTPATIENT
Start: 2023-05-18 | End: 2023-05-18

## 2023-05-18 RX ORDER — DOCUSATE SODIUM 100 MG/1
100 CAPSULE, LIQUID FILLED ORAL 2 TIMES DAILY
Status: DISCONTINUED | OUTPATIENT
Start: 2023-05-18 | End: 2023-05-20 | Stop reason: HOSPADM

## 2023-05-18 RX ORDER — ONDANSETRON 2 MG/ML
4 INJECTION INTRAMUSCULAR; INTRAVENOUS ONCE AS NEEDED
Status: COMPLETED | OUTPATIENT
Start: 2023-05-18 | End: 2023-05-18

## 2023-05-18 RX ORDER — HYDROCODONE BITARTRATE AND ACETAMINOPHEN 10; 325 MG/1; MG/1
1 TABLET ORAL EVERY 4 HOURS PRN
Status: DISCONTINUED | OUTPATIENT
Start: 2023-05-18 | End: 2023-05-20 | Stop reason: HOSPADM

## 2023-05-18 RX ORDER — FAMOTIDINE 20 MG/1
20 TABLET, FILM COATED ORAL 2 TIMES DAILY PRN
Status: DISCONTINUED | OUTPATIENT
Start: 2023-05-18 | End: 2023-05-18 | Stop reason: HOSPADM

## 2023-05-18 RX ORDER — ONDANSETRON 2 MG/ML
4 INJECTION INTRAMUSCULAR; INTRAVENOUS EVERY 6 HOURS PRN
Status: DISCONTINUED | OUTPATIENT
Start: 2023-05-18 | End: 2023-05-18 | Stop reason: HOSPADM

## 2023-05-18 RX ORDER — ACETAMINOPHEN 325 MG/1
650 TABLET ORAL EVERY 6 HOURS PRN
Status: DISCONTINUED | OUTPATIENT
Start: 2023-05-18 | End: 2023-05-20 | Stop reason: HOSPADM

## 2023-05-18 RX ORDER — FAMOTIDINE 10 MG/ML
20 INJECTION, SOLUTION INTRAVENOUS ONCE AS NEEDED
Status: COMPLETED | OUTPATIENT
Start: 2023-05-18 | End: 2023-05-18

## 2023-05-18 RX ORDER — IBUPROFEN 600 MG/1
600 TABLET ORAL EVERY 6 HOURS PRN
Status: DISCONTINUED | OUTPATIENT
Start: 2023-05-18 | End: 2023-05-20 | Stop reason: HOSPADM

## 2023-05-18 RX ORDER — TERBUTALINE SULFATE 1 MG/ML
0.25 INJECTION, SOLUTION SUBCUTANEOUS AS NEEDED
Status: DISCONTINUED | OUTPATIENT
Start: 2023-05-18 | End: 2023-05-18 | Stop reason: HOSPADM

## 2023-05-18 RX ORDER — HYDROXYZINE 50 MG/1
50 TABLET, FILM COATED ORAL NIGHTLY PRN
Status: DISCONTINUED | OUTPATIENT
Start: 2023-05-18 | End: 2023-05-20 | Stop reason: HOSPADM

## 2023-05-18 RX ORDER — FAMOTIDINE 10 MG/ML
20 INJECTION, SOLUTION INTRAVENOUS 2 TIMES DAILY PRN
Status: DISCONTINUED | OUTPATIENT
Start: 2023-05-18 | End: 2023-05-18 | Stop reason: HOSPADM

## 2023-05-18 RX ORDER — HYDROCORTISONE 25 MG/G
1 CREAM TOPICAL AS NEEDED
Status: DISCONTINUED | OUTPATIENT
Start: 2023-05-18 | End: 2023-05-20 | Stop reason: HOSPADM

## 2023-05-18 RX ORDER — SODIUM CHLORIDE 0.9 % (FLUSH) 0.9 %
10 SYRINGE (ML) INJECTION AS NEEDED
Status: DISCONTINUED | OUTPATIENT
Start: 2023-05-18 | End: 2023-05-18 | Stop reason: HOSPADM

## 2023-05-18 RX ORDER — MISOPROSTOL 100 MCG
25 TABLET ORAL
Status: COMPLETED | OUTPATIENT
Start: 2023-05-18 | End: 2023-05-18

## 2023-05-18 RX ORDER — LIDOCAINE HYDROCHLORIDE 10 MG/ML
5 INJECTION, SOLUTION EPIDURAL; INFILTRATION; INTRACAUDAL; PERINEURAL AS NEEDED
Status: DISCONTINUED | OUTPATIENT
Start: 2023-05-18 | End: 2023-05-18 | Stop reason: HOSPADM

## 2023-05-18 RX ORDER — PRENATAL VIT/IRON FUM/FOLIC AC 27MG-0.8MG
1 TABLET ORAL DAILY
Status: DISCONTINUED | OUTPATIENT
Start: 2023-05-18 | End: 2023-05-20 | Stop reason: HOSPADM

## 2023-05-18 RX ORDER — DIPHENHYDRAMINE HYDROCHLORIDE 50 MG/ML
12.5 INJECTION INTRAMUSCULAR; INTRAVENOUS EVERY 8 HOURS PRN
Status: DISCONTINUED | OUTPATIENT
Start: 2023-05-18 | End: 2023-05-18 | Stop reason: HOSPADM

## 2023-05-18 RX ORDER — METHYLERGONOVINE MALEATE 0.2 MG/ML
200 INJECTION INTRAVENOUS ONCE AS NEEDED
Status: DISCONTINUED | OUTPATIENT
Start: 2023-05-18 | End: 2023-05-18 | Stop reason: HOSPADM

## 2023-05-18 RX ORDER — PROMETHAZINE HYDROCHLORIDE 12.5 MG/1
12.5 TABLET ORAL EVERY 4 HOURS PRN
Status: DISCONTINUED | OUTPATIENT
Start: 2023-05-18 | End: 2023-05-20 | Stop reason: HOSPADM

## 2023-05-18 RX ORDER — ACETAMINOPHEN 325 MG/1
650 TABLET ORAL EVERY 4 HOURS PRN
Status: DISCONTINUED | OUTPATIENT
Start: 2023-05-18 | End: 2023-05-18 | Stop reason: HOSPADM

## 2023-05-18 RX ORDER — OXYTOCIN/0.9 % SODIUM CHLORIDE 30/500 ML
999 PLASTIC BAG, INJECTION (ML) INTRAVENOUS ONCE
Status: DISCONTINUED | OUTPATIENT
Start: 2023-05-18 | End: 2023-05-18 | Stop reason: HOSPADM

## 2023-05-18 RX ORDER — OXYTOCIN/0.9 % SODIUM CHLORIDE 30/500 ML
250 PLASTIC BAG, INJECTION (ML) INTRAVENOUS CONTINUOUS
Status: DISCONTINUED | OUTPATIENT
Start: 2023-05-18 | End: 2023-05-18

## 2023-05-18 RX ORDER — CARBOPROST TROMETHAMINE 250 UG/ML
250 INJECTION, SOLUTION INTRAMUSCULAR
Status: DISCONTINUED | OUTPATIENT
Start: 2023-05-18 | End: 2023-05-18 | Stop reason: HOSPADM

## 2023-05-18 RX ORDER — HYDROCODONE BITARTRATE AND ACETAMINOPHEN 5; 325 MG/1; MG/1
1 TABLET ORAL EVERY 4 HOURS PRN
Status: DISCONTINUED | OUTPATIENT
Start: 2023-05-18 | End: 2023-05-20 | Stop reason: HOSPADM

## 2023-05-18 RX ORDER — LOPERAMIDE HYDROCHLORIDE 2 MG/1
4 CAPSULE ORAL ONCE
Status: COMPLETED | OUTPATIENT
Start: 2023-05-18 | End: 2023-05-18

## 2023-05-18 RX ORDER — SODIUM CHLORIDE 0.9 % (FLUSH) 0.9 %
10 SYRINGE (ML) INJECTION EVERY 12 HOURS SCHEDULED
Status: DISCONTINUED | OUTPATIENT
Start: 2023-05-18 | End: 2023-05-18 | Stop reason: HOSPADM

## 2023-05-18 RX ORDER — TRANEXAMIC ACID 10 MG/ML
1000 INJECTION, SOLUTION INTRAVENOUS ONCE AS NEEDED
Status: DISCONTINUED | OUTPATIENT
Start: 2023-05-18 | End: 2023-05-18 | Stop reason: HOSPADM

## 2023-05-18 RX ORDER — BISACODYL 10 MG
10 SUPPOSITORY, RECTAL RECTAL DAILY PRN
Status: DISCONTINUED | OUTPATIENT
Start: 2023-05-19 | End: 2023-05-20 | Stop reason: HOSPADM

## 2023-05-18 RX ORDER — OXYTOCIN/0.9 % SODIUM CHLORIDE 30/500 ML
2-20 PLASTIC BAG, INJECTION (ML) INTRAVENOUS
Status: DISCONTINUED | OUTPATIENT
Start: 2023-05-18 | End: 2023-05-18

## 2023-05-18 RX ORDER — MAGNESIUM CARB/ALUMINUM HYDROX 105-160MG
30 TABLET,CHEWABLE ORAL ONCE AS NEEDED
Status: DISCONTINUED | OUTPATIENT
Start: 2023-05-18 | End: 2023-05-18 | Stop reason: HOSPADM

## 2023-05-18 RX ORDER — EPHEDRINE SULFATE 50 MG/ML
5 INJECTION, SOLUTION INTRAVENOUS
Status: DISCONTINUED | OUTPATIENT
Start: 2023-05-18 | End: 2023-05-18 | Stop reason: HOSPADM

## 2023-05-18 RX ORDER — ROPIVACAINE HYDROCHLORIDE 2 MG/ML
INJECTION, SOLUTION EPIDURAL; INFILTRATION; PERINEURAL AS NEEDED
Status: DISCONTINUED | OUTPATIENT
Start: 2023-05-18 | End: 2023-05-18 | Stop reason: SURG

## 2023-05-18 RX ORDER — MISOPROSTOL 200 UG/1
800 TABLET ORAL ONCE AS NEEDED
Status: DISCONTINUED | OUTPATIENT
Start: 2023-05-18 | End: 2023-05-18 | Stop reason: HOSPADM

## 2023-05-18 RX ORDER — SODIUM CHLORIDE 0.9 % (FLUSH) 0.9 %
1-10 SYRINGE (ML) INJECTION AS NEEDED
Status: DISCONTINUED | OUTPATIENT
Start: 2023-05-18 | End: 2023-05-20 | Stop reason: HOSPADM

## 2023-05-18 RX ORDER — SODIUM CHLORIDE, SODIUM LACTATE, POTASSIUM CHLORIDE, CALCIUM CHLORIDE 600; 310; 30; 20 MG/100ML; MG/100ML; MG/100ML; MG/100ML
125 INJECTION, SOLUTION INTRAVENOUS CONTINUOUS
Status: DISCONTINUED | OUTPATIENT
Start: 2023-05-18 | End: 2023-05-18

## 2023-05-18 RX ADMIN — SODIUM CHLORIDE, POTASSIUM CHLORIDE, SODIUM LACTATE AND CALCIUM CHLORIDE 999 ML/HR: 600; 310; 30; 20 INJECTION, SOLUTION INTRAVENOUS at 11:59

## 2023-05-18 RX ADMIN — LIDOCAINE HYDROCHLORIDE 3 ML: 10; .005 INJECTION, SOLUTION EPIDURAL; INFILTRATION; INTRACAUDAL; PERINEURAL at 12:03

## 2023-05-18 RX ADMIN — IBUPROFEN 600 MG: 600 TABLET, FILM COATED ORAL at 17:13

## 2023-05-18 RX ADMIN — Medication 10 ML/HR: at 12:10

## 2023-05-18 RX ADMIN — ROPIVACAINE HYDROCHLORIDE 10 ML: 2 INJECTION, SOLUTION EPIDURAL; INFILTRATION at 12:08

## 2023-05-18 RX ADMIN — Medication 10 ML: at 05:15

## 2023-05-18 RX ADMIN — Medication 2 MILLI-UNITS/MIN: at 14:04

## 2023-05-18 RX ADMIN — ONDANSETRON 4 MG: 2 INJECTION INTRAMUSCULAR; INTRAVENOUS at 17:48

## 2023-05-18 RX ADMIN — Medication 25 MCG: at 05:12

## 2023-05-18 RX ADMIN — ONDANSETRON 4 MG: 2 INJECTION INTRAMUSCULAR; INTRAVENOUS at 12:23

## 2023-05-18 RX ADMIN — Medication 25 MCG: at 01:10

## 2023-05-18 RX ADMIN — ONDANSETRON 4 MG: 2 INJECTION INTRAMUSCULAR; INTRAVENOUS at 05:14

## 2023-05-18 RX ADMIN — HYDROCODONE BITARTRATE AND ACETAMINOPHEN 1 TABLET: 5; 325 TABLET ORAL at 21:47

## 2023-05-18 RX ADMIN — LOPERAMIDE HYDROCHLORIDE 4 MG: 2 CAPSULE ORAL at 09:32

## 2023-05-18 RX ADMIN — FAMOTIDINE 20 MG: 10 INJECTION INTRAVENOUS at 12:24

## 2023-05-18 NOTE — ANESTHESIA PREPROCEDURE EVALUATION
Anesthesia Evaluation                  Airway   Mallampati: I  TM distance: >3 FB  Neck ROM: full  No difficulty expected  Dental - normal exam     Pulmonary - normal exam   Cardiovascular - normal exam        Neuro/Psych  (+) dizziness/light headedness,    GI/Hepatic/Renal/Endo      Musculoskeletal     Abdominal  - normal exam    Bowel sounds: normal.   Substance History      OB/GYN    (+) Pregnant,         Other                        Anesthesia Plan    ASA 2     epidural       Anesthetic plan, risks, benefits, and alternatives have been provided, discussed and informed consent has been obtained with: patient.        CODE STATUS:    Level Of Support Discussed With: Patient  Code Status (Patient has no pulse and is not breathing): CPR (Attempt to Resuscitate)  Medical Interventions (Patient has pulse or is breathing): Full Support  Release to patient: Routine Release

## 2023-05-18 NOTE — L&D DELIVERY NOTE
[facility]  Vaginal Delivery Note    Delivery    Predelivery Diagnoses: 1) Pregnancy at 39w0d                                                          Postdelivery Diagnoses 1) Pregnancy at 39w0d                                              Attending :  Rober Grayson MD     Procedure : Obstetrical controlled vaginal delivery    Delivery Narrative :     Sarah Clifford is a 22 y.o. year old  @ 39w0d estimated gestational age. She presented to L&D for scheduled induction.  Her prenatal care was with Dr. Grayson and was complicated by anemia and GBS carrier.  She was admitted and started on misoprostol, after two doses she was doing well and we performed amniotomy with placement of IUPC.  A short time later she received her epidural. Pitocin was started along the way for some contractions spacing out, but she got to active labor and progressed well through active labor. I was notified when complete and ready to start pushing.       Upon my arrival she was prepped in the lithotomy position, and was doing well in her pushing efforts  After three pushes, she delivered the fetal head in OA presentation, bulb suction was performed, then using a sidhu type maneuver, pressure was placed along the posterior aspect of the anterior shoulder and it delivered without difficulty. One nuchal cord noted, and reduced prior to delivery.  The infant showed good cry and tone and was placed upon the Mother's abdomen. After a thirty second delay, I then clamped the cord and it was cut by the father of the baby.  Care of the infant was then turned over to the delivery team. Cord blood was obtained and then using gentle pressure the placenta was delivered spontaneous/intact and noted to have 3 vessel cord.  At that point I undertook inspection of the perineum and vulva and I repaired right labial laceration using 3-0 Monocryl in standard fashion with good hemostatic and cosmetic results.       After repair of all lacerations, the  area was noted to be hemostatic and all sponge and needle counts were correct. A vaginal exam and rectal exam showed no issues with retained equipment or suture in an abnormal place.      There were three family member(s) noted to be in the room with this patient.            Delivery:  Vaginal delivery    YOB: 2021    Time of Birth: 4:26 PM      Anesthesia: Epidural             QBL: 66 cc          Infant    Findings: male  infant     Infant observations: Weight: No birth weight on file.   Length:   in  Observations/Comments:  scale 1      Apgars: 8  @ 1 minute /    9  @ 5 minutes       Complications  none    Disposition  Mother to Mother Baby/Postpartum  in stable condition currently.  Baby to remains with mom  in stable condition currently.      Rober Grayson MD  05/18/23  16:49 EDT

## 2023-05-18 NOTE — PROGRESS NOTES
OB Note    22 y.o.  @ 39w0d   Admitted last night for cervical ripening with misoprostol. Underwent two doses and this morning has been observed. Prenatal care with me in the office, complicated by anemia and history of postpartum hemorrhage.     Temp:  [97.8 °F (36.6 °C)-98.6 °F (37 °C)] 98.6 °F (37 °C)  Heart Rate:  [] 91  Resp:  [15-16] 16  BP: ()/(44-71) 108/65    Physical Exam  Constitutional:       General: She is not in acute distress.     Appearance: Normal appearance. She is normal weight.   Genitourinary:      Right Labia: No lesions.     Left Labia: No lesions.     Cervix is not parous (2 cm/70/-2 AROM- clear, with iUPC placed ).      Uterus is enlarged (Gravid ).   Abdominal:      General: There is distension (EFW -7# ).      Palpations: Abdomen is soft.      Tenderness: There is no abdominal tenderness.   Musculoskeletal:         General: No tenderness.      Right lower leg: Edema (trace edema ) present.      Left lower leg: Edema present.   Neurological:      General: No focal deficit present.      Mental Status: She is alert.      Deep Tendon Reflexes: Reflexes normal.   Vitals reviewed. Exam conducted with a chaperone present.       Lab Results   Component Value Date    WBC 7.11 2023    HGB 10.8 (L) 2023    HCT 32.3 (L) 2023    MCV 69.3 (L) 2023     2023       1) Pregnancy at 39w0d  2) Anemia - iron deficiency from pregnancy, mild  3) GBS negative  4) FSR    Starting pitocin  Epidural on request  Continue to monitor labor progress.     Rober Grayson MD  2023  11:38 EDT

## 2023-05-18 NOTE — PLAN OF CARE
Goal Outcome Evaluation:   Pt and SO advised of POC and verbalized understanding.

## 2023-05-18 NOTE — ANESTHESIA PROCEDURE NOTES
Labor Epidural      Patient location during procedure: OB  Start Time: 5/18/2023 11:49 AM  Performed By  Anesthesiologist: Shell Alejandra MD  Preanesthetic Checklist  Completed: patient identified, IV checked, risks and benefits discussed, surgical consent, monitors and equipment checked and pre-op evaluation  Prep:  Pt Position:sitting  Sterile Tech:cap, gloves, mask and sterile barrier  Prep:chlorhexidine gluconate and isopropyl alcohol  Monitoring:blood pressure monitoring and continuous pulse oximetry  Epidural Block Procedure:  Approach:midline  Guidance:landmark technique  Location:L3-L4  Needle Type:Tuohy  Needle Gauge:17 and 17 G  Loss of Resistance Medium: air  Loss of Resistance: 6cm  Paresthesia: none  Aspiration:negative  Test Dose:negative  Number of Attempts: 1  Post Assessment:  Dressing:occlusive dressing applied and secured with tape  Pt Tolerance:patient tolerated the procedure well with no apparent complications  Complications:no

## 2023-05-18 NOTE — H&P
[facility]   HISTORY AND PHYSICAL    Patient Name: Sarah Clifford  : 2000  MRN: [id number]  Primary Care Physician:  Penelope Horn APRN  Date of admission: 2023    Subjective   Subjective     Chief Complaint: Labor induction    HPI:    Sarah Clifford is a 22 y.o. female presents today for scheduled elective labor induction at 39 weeks gestation.  This pregnancy has been complicated by anemia.  She also has a history of sickle cell trait.  She has a history of a vaginal delivery in 2019 of a 6 pound 4 ounce infant.  Delivery was complicated by postpartum hemorrhage.  Delivery note from 2019 was reviewed by me.  No mention of significant postpartum hemorrhage in the delivery note.  Hemoglobin went from 10.5 prior to delivery to 9.1 after delivery.      Review of Systems     Constitutional: No fevers, chills, sweats   Eye: No recent visual problems, denies blurry vision   HEENT: No ear pain, nasal congestion, sore throat, voice changes   Respiratory: No shortness of breath, cough, pain on breathing   Cardiovascular: No Chest pain, palpitations   Gastrointestinal: No nausea, vomiting, diarrhea, constipation   Genitourinary: No hematuria, dysuria, lesions on genitalia   Hema/Lymph: Negative for bruising, no edema   Endocrine: Negative for excessive thirst, excessive hunger, heat or cold intolerance   Musculoskeletal: No joint pain, muscle pain, decreased range of motion   Integumentary: No rash, pruritus, abrasions, lesions   Neurologic: No weakness, numbness, headaches   Psychiatric: No anxiety, depression, mood changes           Personal History     Past Medical History:   Diagnosis Date   • Dizziness    • PPH (postpartum hemorrhage) 2019    unsure if needed blood transfusion       Past Surgical History:   Procedure Laterality Date   • WISDOM TOOTH EXTRACTION         Family History: family history includes Breast cancer in her cousin. Otherwise pertinent FHx was reviewed and not pertinent to  current issue.    Social History:  reports that she quit smoking about 16 months ago. Her smoking use included cigarettes. She smoked an average of 1 pack per day. She has been exposed to tobacco smoke. She has never used smokeless tobacco. She reports that she does not currently use alcohol. She reports that she does not currently use drugs.    Home Medications:  Prenatal, ferrous sulfate, and prenatal vitamins      Allergies:  No Known Allergies    Objective   Objective     Vitals:   Temp:  [97.8 °F (36.6 °C)] 97.8 °F (36.6 °C)  Heart Rate:  [] 90  Resp:  [15-16] 16  BP: ()/(44-71) 110/68  Physical Exam     General: No acute distress, awake and oriented x3   HEENT: Normocephalic atraumatic, moist mucous membranes   Eyes: Pupils equal round reactive to light and accommodation, extraocular muscles intact   Respiratory: Normal work of breathing, good air movement    Abdomen: Gravid, soft, nontender   Cervix: Last exam 1 cm / 5% effaced/-2 per the RN at 1:15 AM   Cephalic by palpation of sutures   Extremities: No calf tenderness, no lower extremity edema   Psychiatric: Good judgment and insight, normal affect and mood   Neurologic: Cranial nerves II through XII intact, no deficits   Skin: No rashes or lesions         Result Review    Result Review:  I have personally reviewed the results from the time of this admission to 5/18/2023 10:00 EDT and agree with these findings:  [x]  Laboratory list / accordion  [x]  Microbiology  [x]  Radiology  []  EKG/Telemetry   []  Cardiology/Vascular   []  Pathology  []  Old records  []  Other:  Most notable findings include:     Admission on 05/18/2023   Component Date Value Ref Range Status   • Glucose 05/18/2023 83  65 - 99 mg/dL Final   • BUN 05/18/2023 4 (L)  6 - 20 mg/dL Final   • Creatinine 05/18/2023 0.57  0.57 - 1.00 mg/dL Final   • Sodium 05/18/2023 137  136 - 145 mmol/L Final   • Potassium 05/18/2023 3.5  3.5 - 5.2 mmol/L Final   • Chloride 05/18/2023 104  98 -  107 mmol/L Final   • CO2 05/18/2023 22.0  22.0 - 29.0 mmol/L Final   • Calcium 05/18/2023 9.4  8.6 - 10.5 mg/dL Final   • Total Protein 05/18/2023 6.7  6.0 - 8.5 g/dL Final   • Albumin 05/18/2023 3.7  3.5 - 5.2 g/dL Final   • ALT (SGPT) 05/18/2023 8  1 - 33 U/L Final   • AST (SGOT) 05/18/2023 15  1 - 32 U/L Final   • Alkaline Phosphatase 05/18/2023 171 (H)  39 - 117 U/L Final   • Total Bilirubin 05/18/2023 <0.2  0.0 - 1.2 mg/dL Final   • Globulin 05/18/2023 3.0  gm/dL Final   • A/G Ratio 05/18/2023 1.2  g/dL Final   • BUN/Creatinine Ratio 05/18/2023 7.0  7.0 - 25.0 Final   • Anion Gap 05/18/2023 11.0  5.0 - 15.0 mmol/L Final   • eGFR 05/18/2023 133.6  >60.0 mL/min/1.73 Final   • ABO Type 05/18/2023 O   Final   • RH type 05/18/2023 Positive   Final   • Antibody Screen 05/18/2023 Negative   Final   • T&S Expiration Date 05/18/2023 5/21/2023 11:59:59 PM   Final   • WBC 05/18/2023 7.11  3.40 - 10.80 10*3/mm3 Final   • RBC 05/18/2023 4.66  3.77 - 5.28 10*6/mm3 Final   • Hemoglobin 05/18/2023 10.8 (L)  12.0 - 15.9 g/dL Final   • Hematocrit 05/18/2023 32.3 (L)  34.0 - 46.6 % Final   • MCV 05/18/2023 69.3 (L)  79.0 - 97.0 fL Final   • MCH 05/18/2023 23.2 (L)  26.6 - 33.0 pg Final   • MCHC 05/18/2023 33.4  31.5 - 35.7 g/dL Final   • RDW 05/18/2023 16.2 (H)  12.3 - 15.4 % Final   • RDW-SD 05/18/2023 39.5  37.0 - 54.0 fl Final   • MPV 05/18/2023 9.6  6.0 - 12.0 fL Final   • Platelets 05/18/2023 292  140 - 450 10*3/mm3 Final   • Neutrophil % 05/18/2023 75.6  42.7 - 76.0 % Final   • Lymphocyte % 05/18/2023 18.9 (L)  19.6 - 45.3 % Final   • Monocyte % 05/18/2023 4.4 (L)  5.0 - 12.0 % Final   • Eosinophil % 05/18/2023 1.1  0.3 - 6.2 % Final   • Neutrophils Absolute 05/18/2023 5.38  1.70 - 7.00 10*3/mm3 Final   • Lymphocytes Absolute 05/18/2023 1.34  0.70 - 3.10 10*3/mm3 Final   • Monocytes Absolute 05/18/2023 0.31  0.10 - 0.90 10*3/mm3 Final   • Eosinophils Absolute 05/18/2023 0.08  0.00 - 0.40 10*3/mm3 Final   • Anisocytosis  2023 Mod/2+  None Seen Final   • Crenated RBC's 2023 Slight/1+  None Seen Final   • Microcytes 2023 Mod/2+  None Seen Final   • Poikilocytes 2023 Mod/2+  None Seen Final   • Schistocytes 2023 Slight/1+  None Seen Final   • Smudge Cells 2023 Slight/1+  None Seen Final   • Platelet Morphology 2023 Normal  Normal Final     GBS negative    Last US (23):  Intrauterine pregnancy at 35w5d  Interval growth  Placental location: Posterior  Lower uterine segment: no previa   AGA   EFW:16%ile, AC:18%ile  Fetal position: Vertex  VERNON: 13.32 cm    NST:  135/reactive/moderate variability/no decelerations  Tocometry: Contractions about every 2 minutes.  Moderate in intensity per the patient  Interpretation: Category 1    Assessment & Plan   Assessment / Plan     Brief Patient Summary:  Sarah Clifford is a 22 y.o. female  3 para 1 at 39-0/7 weeks gestational age for elective labor induction  2.  History of postpartum hemorrhage  3.  Sickle cell trait  4.  GBS negative  5.  Fetal heart tones category 1    Active Hospital Problems:  Active Hospital Problems    Diagnosis    • **Encounter for supervision of other normal pregnancy in third trimester      Plan:   1.  Patient admitted by Dr. Grayson for elective labor induction at 39 weeks of gestation.  Risk, benefits, alternatives have been discussed with the patient at length and she had verbalized understanding.  She wished to proceed with elective labor induction.  She has now received 2 doses of Cytotec.  Lorelei about every 2 minutes, so too frequently to place another Cytotec or start Pitocin.  We will monitor at this time.  Contractions spaced out, will consider third dose of Cytotec versus Pitocin.  2.  Patient may have an epidural upon request.  3.  Plan of care discussed with the patient at length.  All questions answered.    DVT prophylaxis:  Mechanical DVT prophylaxis orders are present.    CODE STATUS:    Level Of  Support Discussed With: Patient  Code Status (Patient has no pulse and is not breathing): CPR (Attempt to Resuscitate)  Medical Interventions (Patient has pulse or is breathing): Full Support  Release to patient: Routine Release    Admission Status:  I believe this patient meets inpatient status.    Jon Angeles MD

## 2023-05-18 NOTE — ANESTHESIA POSTPROCEDURE EVALUATION
Patient: Sarah Clifford    Procedure Summary     Date: 05/18/23 Room / Location:     Anesthesia Start: 1149 Anesthesia Stop: 1626    Procedure: LABOR ANALGESIA Diagnosis:     Scheduled Providers:  Provider: Jake Campbell DO    Anesthesia Type: epidural ASA Status: 2          Anesthesia Type: epidural    Vitals  Vitals Value Taken Time   BP 90/39 05/18/23 1801   Temp 37.3 °C (99.1 °F) 05/18/23 1645   Pulse 93 05/18/23 1801   Resp 18 05/18/23 1715   SpO2 98 % 05/18/23 1615   Vitals shown include unvalidated device data.        Anesthesia Post Evaluation

## 2023-05-19 ENCOUNTER — PATIENT OUTREACH (OUTPATIENT)
Dept: LABOR AND DELIVERY | Facility: HOSPITAL | Age: 23
End: 2023-05-19
Payer: COMMERCIAL

## 2023-05-19 LAB
BASOPHILS # BLD AUTO: 0.03 10*3/MM3 (ref 0–0.2)
BASOPHILS NFR BLD AUTO: 0.4 % (ref 0–1.5)
DEPRECATED RDW RBC AUTO: 41.3 FL (ref 37–54)
EOSINOPHIL # BLD AUTO: 0.06 10*3/MM3 (ref 0–0.4)
EOSINOPHIL NFR BLD AUTO: 0.8 % (ref 0.3–6.2)
ERYTHROCYTE [DISTWIDTH] IN BLOOD BY AUTOMATED COUNT: 16.4 % (ref 12.3–15.4)
HCT VFR BLD AUTO: 29.2 % (ref 34–46.6)
HGB BLD-MCNC: 9.5 G/DL (ref 12–15.9)
LYMPHOCYTES # BLD AUTO: 1.8 10*3/MM3 (ref 0.7–3.1)
LYMPHOCYTES NFR BLD AUTO: 23.7 % (ref 19.6–45.3)
MCH RBC QN AUTO: 23.1 PG (ref 26.6–33)
MCHC RBC AUTO-ENTMCNC: 32.5 G/DL (ref 31.5–35.7)
MCV RBC AUTO: 71 FL (ref 79–97)
MONOCYTES # BLD AUTO: 0.95 10*3/MM3 (ref 0.1–0.9)
MONOCYTES NFR BLD AUTO: 12.5 % (ref 5–12)
NEUTROPHILS NFR BLD AUTO: 4.68 10*3/MM3 (ref 1.7–7)
NEUTROPHILS NFR BLD AUTO: 61.5 % (ref 42.7–76)
PLATELET # BLD AUTO: 229 10*3/MM3 (ref 140–450)
PMV BLD AUTO: 9.6 FL (ref 6–12)
RBC # BLD AUTO: 4.11 10*6/MM3 (ref 3.77–5.28)
WBC NRBC COR # BLD: 7.6 10*3/MM3 (ref 3.4–10.8)

## 2023-05-19 PROCEDURE — 85025 COMPLETE CBC W/AUTO DIFF WBC: CPT | Performed by: OBSTETRICS & GYNECOLOGY

## 2023-05-19 RX ADMIN — IBUPROFEN 600 MG: 600 TABLET, FILM COATED ORAL at 07:27

## 2023-05-19 RX ADMIN — IBUPROFEN 600 MG: 600 TABLET, FILM COATED ORAL at 22:24

## 2023-05-19 RX ADMIN — DOCUSATE SODIUM 100 MG: 100 CAPSULE, LIQUID FILLED ORAL at 20:16

## 2023-05-19 RX ADMIN — FERROUS SULFATE TAB 325 MG (65 MG ELEMENTAL FE) 325 MG: 325 (65 FE) TAB at 07:27

## 2023-05-19 RX ADMIN — HYDROCODONE BITARTRATE AND ACETAMINOPHEN 1 TABLET: 5; 325 TABLET ORAL at 07:27

## 2023-05-19 RX ADMIN — Medication 1 TABLET: at 10:00

## 2023-05-19 RX ADMIN — Medication: at 20:16

## 2023-05-19 RX ADMIN — IBUPROFEN 600 MG: 600 TABLET, FILM COATED ORAL at 17:13

## 2023-05-19 RX ADMIN — DOCUSATE SODIUM 100 MG: 100 CAPSULE, LIQUID FILLED ORAL at 10:00

## 2023-05-19 NOTE — PROGRESS NOTES
Postpartum Progress Note      Status post Vaginal Delivery: Doing well postoperatively.     1) Postpartum care immediately following delivery :    Routine care, continue current care. Likely discharge home tomorrow    2)Anemia: Acute on chronic. Hgb 9.5, drop from admission Hgb 10.8. She is asymptomatic. Continue oral iron supplement.     Rh status: O+  Rubella: Immune  Gender: Male, desires circumcision     Subjective     Postpartum Day 1: Vaginal delivery    The patient feels well. The patient denies emotional concerns. Pain is well controlled with current medications. The baby is well. The patient is ambulating well. The patient is tolerating a normal diet.     Objective     Vital signs in last 24 hours:  Temp:  [96.8 °F (36 °C)-100.4 °F (38 °C)] 97 °F (36.1 °C)  Heart Rate:  [] 82  Resp:  [16-18] 16  BP: ()/(39-89) 93/61      General:    alert, appears stated age and cooperative   CV: RRR, no m/r/g   Lungs: CTAB   Abdomen:  Soft, Non-tender    Lochia:  appropriate   Uterine Fundus:   firm   Ext    Edema trace   DVT Evaluation:  No evidence of DVT seen on physical exam.     Lab Results   Component Value Date    WBC 7.60 05/19/2023    HGB 9.5 (L) 05/19/2023    HCT 29.2 (L) 05/19/2023    MCV 71.0 (L) 05/19/2023     05/19/2023       Amber Saleh, APRN  5/19/2023  09:05 EDT

## 2023-05-19 NOTE — OUTREACH NOTE
[facility]  IP Postpartum    Questions/Answers    Flowsheet Row Responses   Best Method for Contacting Home   Support Person Present Yes   Does the patient have a car seat at the hospital Yes   Delivery Note Reviewed Reviewed   Were birth expectations met? Yes   Is there a need for additional support/resources? No   Lactation Note Reviewed Other   Note Reviewed Other Comment pt states she is formula feeding   Is additional support needed? No   Any questions or concerns? No   Is the patient going to use Meds to Beds? Yes   Any concerns related discharge meds/ability to  prescriptions? No   Confirm Postpartum OB appointment No  [reviewed ]   Confirm initial well-child Pediatrician appointment date/time: --  [reviewed ]   Does patient have transportation to appointments? Yes   Any other assistance needed to ensure she is able to attend appointments? No   Does patient have supplies needed at home for  care? Clothing, Crib, Diapers, Formula        Pt doing well and happy with care. States that she decided to formula feed only, encouraged to call [facility] after discharge. She has all necessary infant supplies and denies resource needs today. Reviewed f/u appts. Will call in one week for f/u.     Mehreen Hernandez RN  Maternity Nurse Navigator    2023, 13:40 EDT

## 2023-05-20 VITALS
TEMPERATURE: 97.1 F | DIASTOLIC BLOOD PRESSURE: 66 MMHG | HEART RATE: 81 BPM | RESPIRATION RATE: 16 BRPM | HEIGHT: 63 IN | WEIGHT: 140.2 LBS | OXYGEN SATURATION: 100 % | BODY MASS INDEX: 24.84 KG/M2 | SYSTOLIC BLOOD PRESSURE: 105 MMHG

## 2023-05-20 RX ORDER — FERROUS SULFATE 325(65) MG
325 TABLET ORAL EVERY OTHER DAY
Qty: 15 TABLET | Refills: 2 | Status: SHIPPED | OUTPATIENT
Start: 2023-05-20

## 2023-05-20 RX ORDER — IBUPROFEN 600 MG/1
600 TABLET ORAL EVERY 6 HOURS PRN
Qty: 28 TABLET | Refills: 0 | Status: SHIPPED | OUTPATIENT
Start: 2023-05-20 | End: 2023-05-27

## 2023-05-20 RX ADMIN — IBUPROFEN 600 MG: 600 TABLET, FILM COATED ORAL at 08:34

## 2023-05-20 RX ADMIN — HYDROCODONE BITARTRATE AND ACETAMINOPHEN 1 TABLET: 5; 325 TABLET ORAL at 01:56

## 2023-05-20 RX ADMIN — FERROUS SULFATE TAB 325 MG (65 MG ELEMENTAL FE) 325 MG: 325 (65 FE) TAB at 08:34

## 2023-05-20 RX ADMIN — DOCUSATE SODIUM 100 MG: 100 CAPSULE, LIQUID FILLED ORAL at 08:34

## 2023-05-20 RX ADMIN — Medication 1 TABLET: at 08:34

## 2023-05-20 NOTE — PROGRESS NOTES
Assessment/Plan:  Status post Vaginal Delivery, PPD 2. Doing well postpatum.   2.  Anemia of pregnancy, delivered, asymptomatic    Plan:  1.  Patient is meeting all milestones for discharge home today.  Extensive discharge instructions given to patient who verbalized understanding.  She should follow-up in the office in 6 weeks.  Home on oral iron    Rh:   Lab Results   Component Value Date    ABORH O Positive 01/03/2019     Rubella:Immune  Infant:male, s/p circ    Subjective:  Postpartum Day 2: Vaginal Delivery    The patient feels well.Pain is well controlled with current medications. Urinary output is adequate. The patient is ambulating well. The patient is tolerating a normal diet. Flatus has been passed.    Objective:  Vital signs in last 24 hours:  Temp:  [97 °F (36.1 °C)-98.1 °F (36.7 °C)] 97.1 °F (36.2 °C)  Heart Rate:  [81-93] 81  Resp:  [16] 16  BP: (100-105)/(60-66) 105/66    No intake/output data recorded.  No intake/output data recorded.          General:    alert, appears stated age and cooperative   Uterine Fundus:   firm   Abdomen:  Soft, nontender, nondistended   DVT Evaluation:  No evidence of DVT seen on physical exam.     Lab Results   Component Value Date    WBC 7.60 05/19/2023    HGB 9.5 (L) 05/19/2023    HCT 29.2 (L) 05/19/2023    MCV 71.0 (L) 05/19/2023     05/19/2023       Lab Results   Component Value Date    GLUCOSE 83 05/18/2023    BUN 4 (L) 05/18/2023    CREATININE 0.57 05/18/2023    EGFR 133.6 05/18/2023    BCR 7.0 05/18/2023    K 3.5 05/18/2023    CO2 22.0 05/18/2023    CALCIUM 9.4 05/18/2023    ALBUMIN 3.7 05/18/2023    BILITOT <0.2 05/18/2023    AST 15 05/18/2023    ALT 8 05/18/2023

## 2023-05-20 NOTE — PLAN OF CARE
Problem: Adult Inpatient Plan of Care  Goal: Plan of Care Review  Outcome: Ongoing, Progressing  Flowsheets (Taken 5/19/2023 2048)  Progress: improving  Plan of Care Reviewed With: patient  Outcome Evaluation: doing well pain well controlled w po meds, plans to dc 5/20  Goal: Patient-Specific Goal (Individualized)  Outcome: Ongoing, Progressing  Goal: Absence of Hospital-Acquired Illness or Injury  Outcome: Ongoing, Progressing  Intervention: Identify and Manage Fall Risk  Description: Perform standard risk assessment on admission using a validated tool or comprehensive approach appropriate to the patient; reassess fall risk frequently, with change in status or transfer to another level of care.  Communicate fall injury risk to interprofessional healthcare team.  Determine need for increased observation, equipment and environmental modification, such as low bed, signage and supportive, nonskid footwear.  Adjust safety measures to individual developmental age, stage and identified risk factors.  Reinforce the importance of safety and physical activity with patient and family.  Perform regular intentional rounding to assess need for position change, pain assessment and personal needs, including assistance with toileting.  Recent Flowsheet Documentation  Taken 5/19/2023 2015 by Kandis Manning, RN  Safety Promotion/Fall Prevention: safety round/check completed  Intervention: Prevent and Manage VTE (Venous Thromboembolism) Risk  Description: Assess for VTE (venous thromboembolism) risk.  Encourage and assist with early ambulation.  Initiate and maintain compression or other therapy, as indicated, based on identified risk in accordance with organizational protocol and provider order.  Encourage both active and passive leg exercises while in bed, if unable to ambulate.  Recent Flowsheet Documentation  Taken 5/19/2023 2015 by Kandis Manning RN  Activity Management: up ad hever  Goal: Optimal  Comfort and Wellbeing  Outcome: Ongoing, Progressing  Intervention: Provide Person-Centered Care  Description: Use a family-focused approach to care.  Develop trust and rapport by proactively providing information, encouraging questions, addressing concerns and offering reassurance.  Acknowledge emotional response to hospitalization.  Recognize and utilize personal coping strategies.  Honor spiritual and cultural preferences.  Recent Flowsheet Documentation  Taken 5/19/2023 2015 by Kandis Manning RN  Trust Relationship/Rapport:   care explained   thoughts/feelings acknowledged  Goal: Readiness for Transition of Care  Outcome: Ongoing, Progressing   Goal Outcome Evaluation:  Plan of Care Reviewed With: patient        Progress: improving  Outcome Evaluation: doing well pain well controlled w po meds, plans to dc 5/20

## 2023-05-20 NOTE — DISCHARGE SUMMARY
[facility]         DISCHARGE SUMMARY    Patient Name: Sarah Clifford  : 2000  MRN: [id number]    Date of Admission: 2023  Date of Discharge: 2023  Primary Care Physician: Penelope Horn APRN    Consults     No orders found from 2023 to 2023.          Presenting Problem:   Encounter for supervision of other normal pregnancy in third trimester [Z34.83]    Active and Resolved Hospital Problems:  Active Hospital Problems    Diagnosis POA   • **Normal vaginal delivery [O80] Not Applicable   • Encounter for supervision of other normal pregnancy in third trimester [Z34.83] Not Applicable   • Maternal anemia in pregnancy, antepartum [O99.019] Yes   • History of postpartum hemorrhage, currently pregnant in third trimester [O09.293] Not Applicable   • Sickle cell trait in mother affecting pregnancy [O99.019, D57.3] Yes      Resolved Hospital Problems   No resolved problems to display.         Hospital Course     Hospital Course:  Sarah Clifford is a 22 y.o. female presented for elective labor induction at 39 weeks of gestation.  Labor course and delivery were unremarkable.  Postpartum course was unremarkable.  By postpartum day #2 she was meeting all milestones for discharge.  Extensive discharge instructions were given to the patient verbalized understanding.  She will follow-up with Dr. Grayson in the office in 6 weeks.        Day of Discharge     Vital Signs:  Temp:  [97 °F (36.1 °C)-98.1 °F (36.7 °C)] 97.1 °F (36.2 °C)  Heart Rate:  [81-93] 81  Resp:  [16] 16  BP: (100-105)/(60-66) 105/66  Physical Exam: See progress notes      Pertinent  and/or Most Recent Results     LAB RESULTS:      Lab 23  0525 23  0040   WBC 7.60 7.11   HEMOGLOBIN 9.5* 10.8*   HEMATOCRIT 29.2* 32.3*   PLATELETS 229 292   NEUTROS ABS 4.68 5.38   LYMPHS ABS 1.80  --    MONOS ABS 0.95*  --    EOS ABS 0.06 0.08   MCV 71.0* 69.3*         Lab 23  0040   SODIUM 137   POTASSIUM 3.5    CHLORIDE 104   CO2 22.0   ANION GAP 11.0   BUN 4*   CREATININE 0.57   EGFR 133.6   GLUCOSE 83   CALCIUM 9.4         Lab 05/18/23  0040   TOTAL PROTEIN 6.7   ALBUMIN 3.7   GLOBULIN 3.0   ALT (SGPT) 8   AST (SGOT) 15   BILIRUBIN <0.2   ALK PHOS 171*                 Lab 05/18/23  0040   ABO TYPING O   RH TYPING Positive   ANTIBODY SCREEN Negative         Brief Urine Lab Results  (Last result in the past 365 days)      Color   Clarity   Blood   Leuk Est   Nitrite   Protein   CREAT   Urine HCG        05/16/23 0000           Negative               Microbiology Results (last 10 days)     ** No results found for the last 240 hours. **                           Labs Pending at Discharge: Not applicable        Discharge Details        Discharge Medications      New Medications      Instructions Start Date   ibuprofen 600 MG tablet  Commonly known as: ADVIL,MOTRIN   600 mg, Oral, Every 6 Hours PRN         Changes to Medications      Instructions Start Date   ferrous sulfate 325 (65 FE) MG tablet  What changed: when to take this   325 mg, Oral, Every Other Day      Prenatal 28-0.8 MG tablet  What changed: Another medication with the same name was removed. Continue taking this medication, and follow the directions you see here.   1 tablet, Oral, Daily, Please use formulary or generic, with DHA ideal             No Known Allergies      Discharge Disposition:  Home or Self Care    Diet:  Hospital:  Diet Order   Procedures   • Diet: Regular/House Diet; Texture: Regular Texture (IDDSI 7); Fluid Consistency: Thin (IDDSI 0)         Discharge Activity:    Gradually resume normal activities over the next 1-2 weeks.  It is normal to have light to moderate vaginal bleeding for up to 6 weeks after delivery.  You may shower.  You may do sitz bath 3 times a day as needed.  Continue perineal care as you have been doing in the hospital.  No submerging in a bathtub for 6 weeks after delivery.  Do not resume sexual intercourse for 6  weeks.  Call the office or return to the hospital for temperature greater than 100.5°F, heavy vaginal bleeding soaking a pad an hour, severe abdominal or vaginal  pain not improved with pain medication, persistent nausea and vomiting, wound breakdown, or any other medical concerns.  Follow-up with your OB/GYN in 6 weeks.  Please call the office to make an appointment.      CODE STATUS:  Code Status and Medical Interventions:   Ordered at: 05/18/23 1938     Code Status (Patient has no pulse and is not breathing):    CPR (Attempt to Resuscitate)     Medical Interventions (Patient has pulse or is breathing):    Full         Future Appointments   Date Time Provider Department Center   5/23/2023 10:30 AM Rober Grayson MD [facility]       Additional Instructions for the Follow-ups that You Need to Schedule     Discharge Follow-up with Specified Provider: your OBGYN; 6 Weeks   As directed      To: your OBGYN    Follow Up: 6 Weeks               Time spent on Discharge including face to face service: Greater than 30 minutes    Jon Angeles MD

## 2023-05-25 ENCOUNTER — PATIENT OUTREACH (OUTPATIENT)
Dept: LABOR AND DELIVERY | Facility: HOSPITAL | Age: 23
End: 2023-05-25
Payer: COMMERCIAL

## 2023-05-25 NOTE — OUTREACH NOTE
[facility]  Postpartum Check-In    Questions/Answers    Flowsheet Row Responses   Visit Setting Telephone   Best Method for Contacting Cell   OB Discharge Note Reviewed  Reviewed   OB Discharge Medications Reviewed  Reviewed    discharged home with mother? Yes   Current Pain Levels 0-10 2   At Rest Pain Levels 0-10 4   Pain level with activity 0-10 2   Acceptable Pain Level 0-10 2   Pain Location Perineum   Pain Description Sore   How do you treat your pain? Medications, Position Changes   Verbalized Emotional State Acceptance   Family/Support Network Mother, Significant Other   Level of Involvement in Care Attentive, Interactive, Supportive   Do you feel comfortable in your relationship with your baby? Yes   Have members of your household adjusted to your baby? Yes   Is the baby's father supportive and/or involved with the baby? Yes   How does your partner feel about the baby? Involved, Happy   Do you feel safe at home, school and work? Yes   Are you in a relationship with someone who threatens you or hurts you? No   Do you have the resources to keep yourself and your baby healthy and safe? Yes   Lochia (per patient report) Brown-marilou Red   Amount Scant   Number of pads per day 3   Lochia Odor None   Is patient breastfeeding? No   How is breast suppression going? ok, still sore   Postpartum Depression Screening Education Education Provided   Doctor Appointments: Education Provided   Breastfeeding Education Education Provided   Family Planning Education Education Provided   Postpartum Care Education Education Provided   Followup Appointments Made No  [will call ]   Well Child Visit Appointments Made Yes   Well Child Checkup Provider Name [facility]   Well Child Check Up Date: 23   Did you complete the visit? No  [states that this was the soonest they could see them ]   Umbilical Cord No reported signs or symptoms   Was the baby circumcised? Yes   Circumcision care and  signs/symptoms to report Reviewed   Feeding Readiness Cues: Eager, Crying   Infant Feeding Method Formula   Formula Type --  [similac total care]   Formula PO (mL) 2 oz   Formula/Expressed Milk frequency of feedings: 3 hours   Number of wet diapers x 24 hours 7   Last BM x 24 hours 6   Emesis (Unmeasured Occurence) 0   What safe sleep surface is available? Crib   Are there stuffed animals, toys, pillows, quilts, blankets, wedges, positioners, bumpers or other loose bedding in the infant's sleeping environment? No   Where does the baby usually sleep? Crib   Does the baby ever share a sleep surface with a sibling, adult or pet? No   Does the baby ever share a sleep surface in a bed, couch, recliner or other? No   What position do you place your baby to sleep for naps? Back   What position do you place your baby to sleep at night Back   Are you and/or other caregivers smoking inside or outside the baby's home? No   Is the infant dressed appropiately for the temperature of the home? Yes   Do you use a clean, dry pacifier that is not attached to a string or stuffed animal? Yes          Review of Systems    Most Recent [place]  Depression Scale Score (EPDS)    Performed by a clinician: 0 (2023  1:21 PM)    Pt reports that everything is going well: minimal pain, scant bleeding, EPDS zero. The infant has not been seen for f/u yet. She reports that they go to [facility] and this was the soonest appointment they had for them. She denies any concerns with infant. She will call for her own follow up. Infant has been added to [facility]. Will send to RN call center.     Mehreen CRUZ - RN  Maternity Nurse Navigator    2023, 13:31 EDT

## 2023-06-01 ENCOUNTER — PATIENT OUTREACH (OUTPATIENT)
Dept: CALL CENTER | Facility: HOSPITAL | Age: 23
End: 2023-06-01

## 2023-06-01 NOTE — OUTREACH NOTE
[facility] Survey    Flowsheet Row Responses   [facility]   [facility] attempt successful? No   Unsuccessful attempts Attempt 1   Reschedule Today            Joanna CRUZ - Registered Nurse

## 2023-06-01 NOTE — OUTREACH NOTE
Motherhood Connection Survey    Flowsheet Row Responses   [facility] facility patient discharged from? [place]   Week 1 attempt successful? No   Unsuccessful attempts Attempt 2   Reschedule Tomorrow            Joanna CRUZ - Registered Nurse

## 2023-06-02 ENCOUNTER — PATIENT OUTREACH (OUTPATIENT)
Dept: CALL CENTER | Facility: HOSPITAL | Age: 23
End: 2023-06-02

## 2023-06-02 NOTE — OUTREACH NOTE
Motherhood Connection Survey    Flowsheet Row Responses   [facility]   [facility] attempt successful? Yes   Call end time 1541   Baby sex Boy    discharged home with mother? Yes   Baby sex Boy   Delivery type Vaginal   Emotional state Acceptance   Family support Yes   Do you have all necessary resources to care for you and your baby?  Yes   Have members of your household adjusted to your baby? Yes   Did you have any problems with pre-eclampsia during this pregnancy? No   Did you have blood glucose issues during this pregnancy No   Lochia amount Light   Lochia per patient report Rubra   Did you have an episiotomy/tear/abdominal incision? Yes   Feeding Method Bottle   Frequency q 3hrs   Amount 3 oz   Signs baby is ready to eat Finger sucking, Crying   Number of wet diapers x 24 hours 6-8   Last BM x 24 hours 7   Umbilical Cord No reported signs or symptoms   Umbilical cord comments cord off   Was the baby circumcised? Yes   Circumcision care and signs/symptoms to report Reviewed   Circumcision comments healed   Where does the baby usually sleep? Crib   Are there stuffed animals, toys, pillows, quilts, blankets, wedges, positioners, bumpers or other loose bedding in the infant's sleeping environment? No   Does the baby ever share a sleep surface in a bed, couch, recliner or other? No   What position do you lay your baby down to sleep? Back   Are you and/or other caregivers smoking inside or outside the baby's home? No   Mom appointment comments: pp f/u to be scheduled   Baby appointment comments: Peds visit scheduled for 23   Additional comments eager to eat   Call completed? Yes   How satisfied were you with the Motherhood Connection Program? 5            Joanna CRUZ - Registered Nurse

## 2024-03-08 ENCOUNTER — TELEPHONE (OUTPATIENT)
Dept: OBSTETRICS AND GYNECOLOGY | Facility: CLINIC | Age: 24
End: 2024-03-08
Payer: COMMERCIAL